# Patient Record
Sex: FEMALE | Race: WHITE | NOT HISPANIC OR LATINO | Employment: OTHER | URBAN - METROPOLITAN AREA
[De-identification: names, ages, dates, MRNs, and addresses within clinical notes are randomized per-mention and may not be internally consistent; named-entity substitution may affect disease eponyms.]

---

## 2017-07-21 ENCOUNTER — OFFICE VISIT (OUTPATIENT)
Dept: OBSTETRICS AND GYNECOLOGY | Facility: CLINIC | Age: 42
End: 2017-07-21

## 2017-07-21 VITALS
SYSTOLIC BLOOD PRESSURE: 118 MMHG | WEIGHT: 293 LBS | DIASTOLIC BLOOD PRESSURE: 94 MMHG | BODY MASS INDEX: 48.82 KG/M2 | HEIGHT: 65 IN

## 2017-07-21 DIAGNOSIS — E78.5 DYSLIPIDEMIA: ICD-10-CM

## 2017-07-21 DIAGNOSIS — Z30.014 ENCOUNTER FOR INITIAL PRESCRIPTION OF INTRAUTERINE CONTRACEPTIVE DEVICE: Primary | ICD-10-CM

## 2017-07-21 DIAGNOSIS — I10 ESSENTIAL HYPERTENSION: ICD-10-CM

## 2017-07-21 DIAGNOSIS — Z13.9 SCREENING: ICD-10-CM

## 2017-07-21 DIAGNOSIS — E66.01 MORBID OBESITY WITH BMI OF 45.0-49.9, ADULT (HCC): ICD-10-CM

## 2017-07-21 LAB
B-HCG UR QL: NEGATIVE
INTERNAL NEGATIVE CONTROL: NEGATIVE
INTERNAL POSITIVE CONTROL: POSITIVE
Lab: NORMAL

## 2017-07-21 PROCEDURE — 99213 OFFICE O/P EST LOW 20 MIN: CPT | Performed by: OBSTETRICS & GYNECOLOGY

## 2017-07-21 PROCEDURE — 81025 URINE PREGNANCY TEST: CPT | Performed by: OBSTETRICS & GYNECOLOGY

## 2017-07-21 RX ORDER — TOPIRAMATE 100 MG/1
TABLET, FILM COATED ORAL DAILY
COMMUNITY
Start: 2017-06-22 | End: 2021-12-29

## 2017-07-21 NOTE — PROGRESS NOTES
[facility], [place], operated by [facility]     2017      Patient:  Maritza Asencio   MR#:[id number]    Office note    CC: contraception     Subjective     History of Present Illness  42 y.o. female  here today for consult on contraception.  The patient was most interested in tubal ligation.  We discussed that her BMI of 50 with a very difficult.  I discussed alternatives to permanent sterilization including IUD placement.  Patient's very interested in IUD placement.  She'll follow up after her next period for renal placement.  We discussed Mirena and that related side effects and pros and cons.      Patient Active Problem List   Diagnosis   • Pulmonary edema, acute   • Morbid obesity with BMI of 45.0-49.9, adult   • Well female exam with routine gynecological exam       Past Medical History:   Diagnosis Date   • Anemia in pregnancy 3/23/2016   • Anxiety    • Depression    • Hyperlipidemia    • Hypertension    • Migraine    • UTI (urinary tract infection) during pregnancy 10/19/2015    E-coli       Past Surgical History:   Procedure Laterality Date   •  SECTION N/A 2016    Procedure:  SECTION PRIMARY;  Surgeon: Xander Rodgers MD;  Location: [facility] LABOR DELIVERY;  Service:    • CHOLECYSTECTOMY         Obstetric History:  OB History      Para Term  AB TAB SAB Ectopic Multiple Living    2 1 1  1  1  0 1        Obstetric Comments    3/22/16 - Normal growth - EFW 52%, AC 36% at 27-6 weeks  16 US 32 2/  EFW 61% AC 61% NORMAL GROWTH BPP 8/8 MERRY NORMAL  MELINA SAGASTUME  16 biophysical profile 8 out of 8 amniotic fluid index 10 cm  MELINA         Menstrual History:     Patient's last menstrual period was 2017.       #: 1, Date: 2009, Sex: None, Weight: None, GA: 5w0d, Delivery: Spontaneous , Apgar1: None, Apgar5: None, Living: Fetal Demise, Birth Comments: None    #: 2, Date: 16, Sex: Female, Weight: 7 lb 14.8 oz (3.595 kg), GA: 39w1d, Delivery: , Low Transverse,  "Apgar1: 9, Apgar5: 9, Living: Yes, Birth Comments: None      Family History   Problem Relation Age of Onset   • Heart disease Father    • Diabetes Father    • Heart attack Father       at age 52   • Hyperlipidemia Father    • Hypertension Mother    • Multiple sclerosis Sister        Social History   Substance Use Topics   • Smoking status: Never Smoker   • Smokeless tobacco: Never Used   • Alcohol use No       Review of patient's allergies indicates no known allergies.      Current Outpatient Prescriptions:   •  atenolol (TENORMIN) 25 MG tablet, Take 25 mg by mouth Daily., Disp: , Rfl:   •  ibuprofen (ADVIL,MOTRIN) 800 MG tablet, , Disp: , Rfl: 5  •  Loratadine (CLARITIN PO), Take 10 mg by mouth daily., Disp: , Rfl:   •  topiramate (TOPAMAX) 100 MG tablet, , Disp: , Rfl:     The following portions of the patient's history were reviewed and updated as appropriate: allergies, current medications, past family history, past medical history, past social history, past surgical history and problem list.    Review of Systems    BP Readings from Last 3 Encounters:   17 118/94   17 138/82   16 120/80      Wt Readings from Last 3 Encounters:   17 299 lb (136 kg)   17 290 lb (132 kg)   16 295 lb (134 kg)      BMI: Estimated body mass index is 49.76 kg/(m^2) as calculated from the following:    Height as of this encounter: 65\" (165.1 cm).    Weight as of this encounter: 299 lb (136 kg).  BSA: Estimated body surface area is 2.35 meters squared as calculated from the following:    Height as of this encounter: 65\" (165.1 cm).    Weight as of this encounter: 299 lb (136 kg).    Objective   Physical Exam   Constitutional: She is oriented to person, place, and time. She appears well-developed and well-nourished.   Cardiovascular: Normal rate and regular rhythm.    Pulmonary/Chest: Effort normal and breath sounds normal.   Abdominal: Soft. Bowel sounds are normal. She exhibits no distension and " no mass. There is no tenderness.   Neurological: She is alert and oriented to person, place, and time.   Psychiatric: She has a normal mood and affect. Her behavior is normal. Judgment and thought content normal.   Nursing note and vitals reviewed.        Assessment/Plan     1.  Interseptal counseling-plan Mirena IUD, patient to follow up after her next menstrual cycle  2.  Morbid obesity  3.  Hypertension - stable  4.  Dyslipidemia - No treatment as this time      Plan:  F/u IUD placement     Xander Rodgers MD   7/21/2017 10:22 AM

## 2017-11-15 ENCOUNTER — OFFICE VISIT (OUTPATIENT)
Dept: OBSTETRICS AND GYNECOLOGY | Facility: CLINIC | Age: 42
End: 2017-11-15

## 2017-11-15 VITALS
WEIGHT: 293 LBS | HEIGHT: 65 IN | SYSTOLIC BLOOD PRESSURE: 116 MMHG | BODY MASS INDEX: 48.82 KG/M2 | DIASTOLIC BLOOD PRESSURE: 80 MMHG

## 2017-11-15 DIAGNOSIS — E78.5 DYSLIPIDEMIA: ICD-10-CM

## 2017-11-15 DIAGNOSIS — E66.01 MORBID OBESITY WITH BMI OF 45.0-49.9, ADULT (HCC): ICD-10-CM

## 2017-11-15 DIAGNOSIS — I10 ESSENTIAL HYPERTENSION: ICD-10-CM

## 2017-11-15 DIAGNOSIS — Z11.3 SCREEN FOR STD (SEXUALLY TRANSMITTED DISEASE): ICD-10-CM

## 2017-11-15 DIAGNOSIS — Z13.89 SCREENING FOR BLOOD OR PROTEIN IN URINE: ICD-10-CM

## 2017-11-15 DIAGNOSIS — Z12.4 SCREENING FOR MALIGNANT NEOPLASM OF CERVIX: ICD-10-CM

## 2017-11-15 DIAGNOSIS — Z01.419 WELL FEMALE EXAM WITH ROUTINE GYNECOLOGICAL EXAM: Primary | ICD-10-CM

## 2017-11-15 DIAGNOSIS — Z12.31 SCREENING MAMMOGRAM, ENCOUNTER FOR: ICD-10-CM

## 2017-11-15 LAB
BILIRUB BLD-MCNC: NEGATIVE MG/DL
CLARITY, POC: CLEAR
COLOR UR: YELLOW
GLUCOSE UR STRIP-MCNC: NEGATIVE MG/DL
KETONES UR QL: NEGATIVE
LEUKOCYTE EST, POC: NEGATIVE
NITRITE UR-MCNC: NEGATIVE MG/ML
PH UR: 5.5 [PH] (ref 5–8)
PROT UR STRIP-MCNC: NEGATIVE MG/DL
RBC # UR STRIP: NEGATIVE /UL
SP GR UR: 1.02 (ref 1–1.03)
UROBILINOGEN UR QL: NORMAL

## 2017-11-15 PROCEDURE — 99396 PREV VISIT EST AGE 40-64: CPT | Performed by: OBSTETRICS & GYNECOLOGY

## 2017-11-15 NOTE — PROGRESS NOTES
[facility]  Routine Annual Visit    11/15/2017    Patient: Maritza Asencio          MR#:[id number]      History of Present Illness    42 y.o. female  who presents for annual exam.        Patient's last menstrual period was 2017 (exact date).  Obstetric History:  OB History      Para Term  AB Living    2 1 1  1 1    SAB TAB Ectopic Multiple Live Births    1   0 1         Menstrual History:     Patient's last menstrual period was 2017 (exact date).       Sexual History:       ________________________________________  Patient Active Problem List   Diagnosis   • Morbid obesity with BMI of 45.0-49.9, adult   • Well female exam with routine gynecological exam   • Essential hypertension   • Dyslipidemia       Past Medical History:   Diagnosis Date   • Anemia in pregnancy 3/23/2016   • Anxiety    • Depression    • Hyperlipidemia    • Hypertension    • Migraine    • Pulmonary edema, acute 2016    Postpartum day#10, EKG nl, Echo normal EF, , Sx improved with lasix.    • UTI (urinary tract infection) during pregnancy 10/19/2015    E-coli       Past Surgical History:   Procedure Laterality Date   •  SECTION N/A 2016    Procedure:  SECTION PRIMARY;  Surgeon: Xander Rodgers MD;  Location: [facility] LABOR DELIVERY;  Service:    • CHOLECYSTECTOMY         History   Smoking Status   • Never Smoker   Smokeless Tobacco   • Never Used       Family History   Problem Relation Age of Onset   • Heart disease Father    • Diabetes Father    • Heart attack Father       at age 52   • Hyperlipidemia Father    • Hypertension Mother    • Multiple sclerosis Sister        Prior to Admission medications    Medication Sig Start Date End Date Taking? Authorizing Provider   atenolol (TENORMIN) 25 MG tablet Take 25 mg by mouth Daily.   Yes Nurse [facility] Emergency, RN   ibuprofen (ADVIL,MOTRIN) 800 MG tablet Every 6 (Six) Hours As Needed. 16  Yes Historical Provider, MD  "  Loratadine (CLARITIN PO) Take 10 mg by mouth daily.   Yes Historical Provider, MD   topiramate (TOPAMAX) 100 MG tablet Daily. 6/22/17  Yes Historical Provider, MD     ________________________________________    Current contraception: condoms  History of abnormal Pap smear: no  Family history of uterine or ovarian cancer: no  Family History of colon cancer/colon polyps: no  History of abnormal mammogram: no  History of abnormal lipids: yes - Not treated currently     The following portions of the patient's history were reviewed and updated as appropriate: allergies, current medications, past family history, past medical history, past social history, past surgical history and problem list.    Review of Systems    Pertinent items are noted in HPI.     Objective   Physical Exam    /80  Ht 65\" (165.1 cm)  Wt 294 lb (133 kg)  LMP 11/09/2017 (Exact Date)  Breastfeeding? No  BMI 48.92 kg/m2   BP Readings from Last 3 Encounters:   11/15/17 116/80   07/21/17 118/94   05/16/17 138/82      Wt Readings from Last 3 Encounters:   11/15/17 294 lb (133 kg)   07/21/17 299 lb (136 kg)   05/16/17 290 lb (132 kg)        BMI: Estimated body mass index is 48.92 kg/(m^2) as calculated from the following:    Height as of this encounter: 65\" (165.1 cm).    Weight as of this encounter: 294 lb (133 kg).    General:   alert, appears stated age and cooperative   Heart: regular rate and rhythm, S1, S2 normal, no murmur, click, rub or gallop   Lungs: clear to auscultation bilaterally   Abdomen: soft, non-tender, without masses or organomegaly and morbid obesity   Breast: inspection negative, no nipple discharge or bleeding, no masses or nodularity palpable   Vulva: normal   Vagina: normal mucosa   Cervix: no lesions   Uterus: normal size or Size limits the exam   Adnexa: exam limited by habitus     As part of wellness and prevention, the following topics were discussed with the patient:  []  Nutrition  []  Physical activity/regular " exercise   []  Healthy weight  []  Injury prevention  []  Substance misuse/abuse  []  Sexual behavior  []  STD prevention  [x]  Contaception  []  Dental health  []  Mental health  []  Immunization  [x]  Encouraged SBE       Assessment:    Maritza was seen today for gynecologic exam.    Diagnoses and all orders for this visit:    Screening for blood or protein in urine  -     POC Urinalysis Dipstick    Screening for malignant neoplasm of cervix  -     IgP, Aptima HPV - ThinPrep Vial, Cervix    Dyslipidemia  - management by Dr. Zurita    Essential hypertension  - management by Dr. Zurita    Morbid obesity with BMI of 45.0-49.9, adult    Well female exam with routine gynecological exam    Screening mammogram, encounter for  -     Mammo Screening Bilateral With CAD; Future      Plan:  Return in about 1 year (around 11/15/2018) for Annual GYN exam.      Xander Rodgers MD  11/15/2017 9:43 AM

## 2017-11-17 LAB
CYTOLOGIST CVX/VAG CYTO: NORMAL
CYTOLOGY CVX/VAG DOC THIN PREP: NORMAL
DX ICD CODE: NORMAL
DX ICD CODE: NORMAL
HIV 1 & 2 AB SER-IMP: NORMAL
HPV I/H RISK 4 DNA CVX QL PROBE+SIG AMP: NEGATIVE
OTHER STN SPEC: NORMAL
PATH REPORT.FINAL DX SPEC: NORMAL
STAT OF ADQ CVX/VAG CYTO-IMP: NORMAL

## 2017-11-20 ENCOUNTER — TELEPHONE (OUTPATIENT)
Dept: OBSTETRICS AND GYNECOLOGY | Facility: CLINIC | Age: 42
End: 2017-11-20

## 2017-11-20 NOTE — TELEPHONE ENCOUNTER
----- Message from Xander Rodgers MD sent at 11/17/2017  9:16 PM EST -----  Call pt:  PAP is negative.  + Yeast on PAP - treatment indicated if symptomatic

## 2017-11-21 LAB
C TRACH RRNA SPEC QL NAA+PROBE: NEGATIVE
N GONORRHOEA RRNA SPEC QL NAA+PROBE: NEGATIVE
T VAGINALIS RRNA SPEC QL NAA+PROBE: NEGATIVE

## 2017-11-30 ENCOUNTER — HOSPITAL ENCOUNTER (OUTPATIENT)
Dept: MAMMOGRAPHY | Facility: HOSPITAL | Age: 42
Discharge: HOME | End: 2017-11-30
Attending: OBSTETRICS & GYNECOLOGY | Admitting: OBSTETRICS & GYNECOLOGY

## 2017-11-30 DIAGNOSIS — Z12.31 SCREENING MAMMOGRAM, ENCOUNTER FOR: ICD-10-CM

## 2017-11-30 PROCEDURE — G0202 SCR MAMMO BI INCL CAD: HCPCS

## 2017-12-04 ENCOUNTER — TELEPHONE (OUTPATIENT)
Dept: OBSTETRICS AND GYNECOLOGY | Facility: CLINIC | Age: 42
End: 2017-12-04

## 2017-12-04 NOTE — TELEPHONE ENCOUNTER
----- Message from Xander Rodgers MD sent at 12/2/2017  3:10 AM EST -----  Call patient: Normal mammogram

## 2017-12-05 ENCOUNTER — TELEPHONE (OUTPATIENT)
Dept: OBSTETRICS AND GYNECOLOGY | Facility: CLINIC | Age: 42
End: 2017-12-05

## 2018-11-28 ENCOUNTER — OFFICE VISIT (OUTPATIENT)
Dept: OBSTETRICS AND GYNECOLOGY | Age: 43
End: 2018-11-28

## 2018-11-28 VITALS
SYSTOLIC BLOOD PRESSURE: 118 MMHG | BODY MASS INDEX: 48.82 KG/M2 | DIASTOLIC BLOOD PRESSURE: 80 MMHG | WEIGHT: 293 LBS | HEIGHT: 65 IN

## 2018-11-28 DIAGNOSIS — E78.5 DYSLIPIDEMIA: ICD-10-CM

## 2018-11-28 DIAGNOSIS — E66.01 MORBID OBESITY WITH BMI OF 45.0-49.9, ADULT (HCC): ICD-10-CM

## 2018-11-28 DIAGNOSIS — Z12.31 SCREENING MAMMOGRAM, ENCOUNTER FOR: ICD-10-CM

## 2018-11-28 DIAGNOSIS — I10 ESSENTIAL HYPERTENSION: ICD-10-CM

## 2018-11-28 DIAGNOSIS — Z01.419 WELL FEMALE EXAM WITH ROUTINE GYNECOLOGICAL EXAM: Primary | ICD-10-CM

## 2018-11-28 DIAGNOSIS — Z12.4 PAP SMEAR FOR CERVICAL CANCER SCREENING: ICD-10-CM

## 2018-11-28 DIAGNOSIS — Z13.89 SCREENING FOR BLOOD OR PROTEIN IN URINE: ICD-10-CM

## 2018-11-28 PROCEDURE — 99396 PREV VISIT EST AGE 40-64: CPT | Performed by: OBSTETRICS & GYNECOLOGY

## 2018-11-28 NOTE — PROGRESS NOTES
Routine Annual Visit    2018    Patient: Maritza Asencio          MR#:[id number]      History of Present Illness    Chief Complaint   Patient presents with   • Gynecologic Exam     Annual.  Last pap 11/15/17, negative. Last mammo 17, negative.        43 y.o. female  who presents for annual exam.    The patient presents for routine annual exam feeling well without complaints.        Patient's last menstrual period was 2018 (approximate).  Obstetric History:  OB History      Para Term  AB Living    2 1 1   1 1    SAB TAB Ectopic Molar Multiple Live Births    1       0 1         Menstrual History:     Patient's last menstrual period was 2018 (approximate).       Sexual History:       ________________________________________  Patient Active Problem List   Diagnosis   • Morbid obesity with BMI of 45.0-49.9, adult (CMS/[facility])   • Well female exam with routine gynecological exam   • Essential hypertension   • Dyslipidemia       Past Medical History:   Diagnosis Date   • Anemia in pregnancy 3/23/2016   • Anxiety    • Depression    • Hyperlipidemia    • Hypertension    • Migraine    • Pulmonary edema, acute (CMS/[facility]) 2016    Postpartum day#10, EKG nl, Echo normal EF, , Sx improved with lasix.    • UTI (urinary tract infection) during pregnancy 10/19/2015    E-coli       Past Surgical History:   Procedure Laterality Date   • ANAL FISSURECTOMY  2018   • CHOLECYSTECTOMY     • HEMORRHOIDECTOMY  2018       Social History     Tobacco Use   Smoking Status Never Smoker   Smokeless Tobacco Never Used       Family History   Problem Relation Age of Onset   • Heart disease Father    • Diabetes Father    • Heart attack Father          at age 52   • Hyperlipidemia Father    • Hypertension Mother    • Multiple sclerosis Sister        Prior to Admission medications    Medication Sig Start Date End Date Taking? Authorizing Provider   atenolol (TENORMIN) 25 MG tablet  "Take 25 mg by mouth Daily.   Yes Emergency, Nurse Blanka, RN   Loratadine (CLARITIN PO) Take 10 mg by mouth daily.   Yes Provider, Kerri, MD   topiramate (TOPAMAX) 100 MG tablet Daily. 6/22/17  Yes Provider, Kerri, MD     ________________________________________    Current contraception: condoms  History of abnormal Pap smear: no  Family history of uterine or ovarian cancer: no  Family History of colon cancer/colon polyps: no  History of abnormal mammogram: no  History of abnormal lipids: yes - managed per primary MD     The following portions of the patient's history were reviewed and updated as appropriate: allergies, current medications, past family history, past medical history, past social history, past surgical history and problem list.    Review of Systems    Pertinent items are noted in HPI.     Objective   Physical Exam    /80   Ht 165.1 cm (65\")   Wt 135 kg (297 lb)   LMP 11/02/2018 (Approximate)   Breastfeeding? No   BMI 49.42 kg/m²    BP Readings from Last 3 Encounters:   11/28/18 118/80   11/15/17 116/80   07/21/17 118/94      Wt Readings from Last 3 Encounters:   11/28/18 135 kg (297 lb)   11/15/17 133 kg (294 lb)   07/21/17 136 kg (299 lb)        BMI: Estimated body mass index is 49.42 kg/m² as calculated from the following:    Height as of this encounter: 165.1 cm (65\").    Weight as of this encounter: 135 kg (297 lb).       General: alert, appears stated age, cooperative and morbidly obese   Heart: regular rate and rhythm, S1, S2 normal, no murmur, click, rub or gallop   Lungs: clear to auscultation bilaterally   Abdomen: soft, non-tender, without masses or organomegaly   Breast: inspection negative, no nipple discharge or bleeding, no masses or nodularity palpable   Vulva: normal and bartholin's, Urethra, [place]'s normal   Vagina: normal mucosa, normal discharge   Cervix: multiparous appearance   Uterus: normal size or exam is limited habitus    Adnexa: exam limited by habitus "     As part of wellness and prevention, the following topics were discussed with the patient:    []  Nutrition  [x]  Physical activity/regular exercise   []  Healthy weight  []  Injury prevention  []  Smoking cessation  []  Substance misuse/abuse  []  Sexual behavior  []  STD prevention  []  Contaception  []  Dental health  []  Mental health  []  Immunization  [x]  Encouraged SBE        Assessment:    Maritza was seen today for gynecologic exam.    Diagnoses and all orders for this visit:    Well female exam with routine gynecological exam  -     IgP, Aptima HPV    Screening for blood or protein in urine  -     POC Urinalysis Dipstick    Pap smear for cervical cancer screening  -     IgP, Aptima HPV    Morbid obesity with BMI of 45.0-49.9, adult (CMS/[facility])    Essential hypertension (Chronic - stable)    Dyslipidemia (Chronic - stable)  - management per primary MD     Screening mammogram, encounter for  -     Mammo Screening Bilateral With CAD; Future      Plan:  Return in about 1 year (around 11/28/2019) for Annual GYN exam.      Xander Rodgers MD  11/28/2018 9:27 AM

## 2018-11-30 LAB
CYTOLOGIST CVX/VAG CYTO: ABNORMAL
CYTOLOGY CVX/VAG DOC THIN PREP: ABNORMAL
DX ICD CODE: ABNORMAL
DX ICD CODE: ABNORMAL
HIV 1 & 2 AB SER-IMP: ABNORMAL
HPV I/H RISK 4 DNA CVX QL PROBE+SIG AMP: NEGATIVE
OTHER STN SPEC: ABNORMAL
PATH REPORT.FINAL DX SPEC: ABNORMAL
PATHOLOGIST CVX/VAG CYTO: ABNORMAL
RECOM F/U CVX/VAG CYTO: ABNORMAL
STAT OF ADQ CVX/VAG CYTO-IMP: ABNORMAL

## 2018-12-03 ENCOUNTER — TELEPHONE (OUTPATIENT)
Dept: OBSTETRICS AND GYNECOLOGY | Age: 43
End: 2018-12-03

## 2018-12-03 NOTE — TELEPHONE ENCOUNTER
----- Message from Xander Rodgers MD sent at 12/1/2018  7:44 AM EST -----  Call patient: Pap shows atypical cells of undetermined significance with hybrid capture negative    No treatment is needed at this time.    Patient is advised to follow up annually for PAP

## 2018-12-11 ENCOUNTER — HOSPITAL ENCOUNTER (OUTPATIENT)
Dept: MAMMOGRAPHY | Facility: HOSPITAL | Age: 43
Discharge: HOME | End: 2018-12-11
Attending: OBSTETRICS & GYNECOLOGY | Admitting: OBSTETRICS & GYNECOLOGY

## 2018-12-11 DIAGNOSIS — Z12.31 SCREENING MAMMOGRAM, ENCOUNTER FOR: ICD-10-CM

## 2018-12-11 PROCEDURE — 77067 SCR MAMMO BI INCL CAD: CPT

## 2018-12-13 ENCOUNTER — TELEPHONE (OUTPATIENT)
Dept: OBSTETRICS AND GYNECOLOGY | Age: 43
End: 2018-12-13

## 2018-12-13 NOTE — TELEPHONE ENCOUNTER
----- Message from Xander Rodgers MD sent at 12/13/2018 11:38 AM EST -----  Notify patient:  Mammogram is benign

## 2019-12-18 ENCOUNTER — OFFICE VISIT (OUTPATIENT)
Dept: OBSTETRICS AND GYNECOLOGY | Age: 44
End: 2019-12-18

## 2019-12-18 VITALS
BODY MASS INDEX: 50.02 KG/M2 | HEIGHT: 64 IN | DIASTOLIC BLOOD PRESSURE: 78 MMHG | WEIGHT: 293 LBS | SYSTOLIC BLOOD PRESSURE: 116 MMHG

## 2019-12-18 DIAGNOSIS — I10 ESSENTIAL HYPERTENSION: ICD-10-CM

## 2019-12-18 DIAGNOSIS — Z12.4 PAP SMEAR FOR CERVICAL CANCER SCREENING: ICD-10-CM

## 2019-12-18 DIAGNOSIS — Z01.419 WELL FEMALE EXAM WITH ROUTINE GYNECOLOGICAL EXAM: Primary | ICD-10-CM

## 2019-12-18 DIAGNOSIS — Z13.89 SCREENING FOR BLOOD OR PROTEIN IN URINE: ICD-10-CM

## 2019-12-18 DIAGNOSIS — Z12.31 SCREENING MAMMOGRAM, ENCOUNTER FOR: ICD-10-CM

## 2019-12-18 DIAGNOSIS — E66.01 MORBID OBESITY WITH BMI OF 50.0-59.9, ADULT (HCC): ICD-10-CM

## 2019-12-18 DIAGNOSIS — E78.5 DYSLIPIDEMIA: ICD-10-CM

## 2019-12-18 PROBLEM — E78.2 MIXED HYPERLIPIDEMIA: Status: ACTIVE | Noted: 2019-03-05

## 2019-12-18 PROBLEM — F41.9 ANXIETY: Status: ACTIVE | Noted: 2019-03-05

## 2019-12-18 LAB
BILIRUB BLD-MCNC: NEGATIVE MG/DL
CLARITY, POC: CLEAR
COLOR UR: YELLOW
GLUCOSE UR STRIP-MCNC: NEGATIVE MG/DL
KETONES UR QL: NEGATIVE
LEUKOCYTE EST, POC: NEGATIVE
NITRITE UR-MCNC: NEGATIVE MG/ML
PH UR: 6 [PH] (ref 5–8)
PROT UR STRIP-MCNC: NEGATIVE MG/DL
RBC # UR STRIP: NEGATIVE /UL
SP GR UR: 1.02 (ref 1–1.03)
UROBILINOGEN UR QL: NORMAL

## 2019-12-18 PROCEDURE — 99396 PREV VISIT EST AGE 40-64: CPT | Performed by: OBSTETRICS & GYNECOLOGY

## 2019-12-18 PROCEDURE — 81002 URINALYSIS NONAUTO W/O SCOPE: CPT | Performed by: OBSTETRICS & GYNECOLOGY

## 2019-12-18 RX ORDER — BACLOFEN 10 MG/1
10 TABLET ORAL 2 TIMES DAILY
COMMUNITY
Start: 2019-12-04 | End: 2020-10-14

## 2019-12-18 RX ORDER — BUPROPION HYDROCHLORIDE 150 MG/1
1 TABLET ORAL DAILY
COMMUNITY
Start: 2019-02-08 | End: 2020-10-14

## 2019-12-18 RX ORDER — ATORVASTATIN CALCIUM 10 MG/1
1 TABLET, FILM COATED ORAL DAILY
COMMUNITY
Start: 2019-02-11 | End: 2020-10-14

## 2019-12-18 NOTE — PROGRESS NOTES
Routine Annual Visit    2019    Patient: Maritza Asencio          MR#:[id number]      History of Present Illness    Chief Complaint   Patient presents with   • Gynecologic Exam     Annual.  last pap 18, ASCUS/hpv negative. Last mammo 18, negative.        44 y.o. female  who presents for annual exam.    The patient presents for routine annual exam without major complaints.  She is currently using condoms for contraception and is inquiring about permanent sterilization.  The patient's current medical condition is complicated by morbid obesity with a body mass index greater than 52.  It is discussed with her that she is an exceptionally poor candidate for even laparoscopic surgery.      Patient's last menstrual period was 2019 (approximate).  Obstetric History:  OB History        2    Para   1    Term   1            AB   1    Living   1       SAB   1    TAB        Ectopic        Molar        Multiple   0    Live Births   1               Menstrual History:     Patient's last menstrual period was 2019 (approximate).       Sexual History:       ________________________________________  Patient Active Problem List   Diagnosis   • Well female exam with routine gynecological exam   • Essential hypertension   • Dyslipidemia   • Mixed hyperlipidemia   • Anxiety   • Morbid obesity with BMI of 50.0-59.9, adult (CMS/[facility])       Past Medical History:   Diagnosis Date   • Anemia in pregnancy 3/23/2016   • Anxiety    • Depression    • Hyperlipidemia    • Hypertension    • Migraine    • Pulmonary edema, acute (CMS/[facility]) 2016    Postpartum day#10, EKG nl, Echo normal EF, , Sx improved with lasix.    • UTI (urinary tract infection) during pregnancy 10/19/2015    E-coli       Past Surgical History:   Procedure Laterality Date   • ANAL FISSURECTOMY  2018   •  SECTION N/A 2016    Procedure:  SECTION PRIMARY;  Surgeon: Xander Rodgers MD;  Location:   "[facility] LABOR DELIVERY;  Service:    • CHOLECYSTECTOMY     • HEMORRHOIDECTOMY  2018       Social History     Tobacco Use   Smoking Status Never Smoker   Smokeless Tobacco Never Used       Family History   Problem Relation Age of Onset   • Heart disease Father    • Diabetes Father    • Heart attack Father          at age 52   • Hyperlipidemia Father    • Hypertension Mother    • Multiple sclerosis Sister    • Breast cancer Neg Hx        Prior to Admission medications    Medication Sig Start Date End Date Taking? Authorizing Provider   atenolol (TENORMIN) 25 MG tablet Take 25 mg by mouth Daily.   Yes Emergency, Nurse Blanka, RN   atorvastatin (LIPITOR) 10 MG tablet Take 1 tablet by mouth Daily. 19  Yes ProviderKerri MD   baclofen (LIORESAL) 10 MG tablet Take 10 mg by mouth 2 (Two) Times a Day. 19  Yes Kerri Ruiz MD   buPROPion XL (WELLBUTRIN XL) 150 MG 24 hr tablet Take 1 tablet by mouth Daily. 19  Yes Kerri Ruiz MD   Loratadine (CLARITIN PO) Take 10 mg by mouth daily.   Yes Kerri Ruiz MD   topiramate (TOPAMAX) 100 MG tablet Daily. 17  Yes Kerri Ruiz MD     ________________________________________    Current contraception: condoms  History of abnormal Pap smear: no  Family history of uterine or ovarian cancer: no  Family History of colon cancer/colon polyps: no  History of abnormal mammogram: no  History of abnormal lipids: yes - treated    The following portions of the patient's history were reviewed and updated as appropriate: allergies, current medications, past family history, past medical history, past social history, past surgical history and problem list.    Review of Systems    Pertinent items are noted in HPI.     Objective   Physical Exam    /78   Ht 162.6 cm (64\")   Wt (!) 139 kg (306 lb)   LMP 2019 (Approximate)   Breastfeeding No   BMI 52.52 kg/m²    BP Readings from Last 3 Encounters:   19 116/78 " "  11/28/18 118/80   11/15/17 116/80      Wt Readings from Last 3 Encounters:   12/18/19 (!) 139 kg (306 lb)   11/28/18 135 kg (297 lb)   11/15/17 133 kg (294 lb)        BMI: Estimated body mass index is 52.52 kg/m² as calculated from the following:    Height as of this encounter: 162.6 cm (64\").    Weight as of this encounter: 139 kg (306 lb).       General: alert, appears stated age, cooperative and moderately obese   Heart: regular rate and rhythm, S1, S2 normal, no murmur, click, rub or gallop   Lungs: clear to auscultation bilaterally   Abdomen: soft, non-tender, without masses, no organomegaly   Breast: inspection negative, no nipple discharge or bleeding, no masses or nodularity palpable   Vulva: normal and bartholin's, Urethra, [place]'s normal   Vagina: normal mucosa, normal discharge   Cervix: no lesions, nulliparous appearance and difficult to visualize    Uterus: normal size or exam is limited habitus    Adnexa: exam limited by habitus     As part of wellness and prevention, the following topics were discussed with the patient:     []  Nutrition  []  Physical activity/regular exercise   []  Healthy weight  []  Injury prevention  []  Smoking cessation  []  Substance misuse/abuse  []  Sexual behavior  []  STD prevention  []  Contaception  []  Dental health  []  Mental health  []  Immunization  [x]  Encouraged SBE        Assessment:    Maritza was seen today for gynecologic exam.    Diagnoses and all orders for this visit:    Well female exam with routine gynecological exam  -     IgP, Aptima HPV    Screening for blood or protein in urine  -     POC Urinalysis Dipstick    Pap smear for cervical cancer screening  -     IgP, Aptima HPV    Essential hypertension    Dyslipidemia    Morbid obesity with BMI of 50.0-59.9, adult (CMS/[facility])    Screening mammogram, encounter for  -     Mammo Screening Bilateral With CAD; Future          Plan:  Return in about 1 year (around 12/18/2020) for Annual GYN exam.      Xander CRAIG" MD Vishal  12/18/2019 1:48 PM

## 2019-12-20 LAB
CYTOLOGIST CVX/VAG CYTO: NORMAL
CYTOLOGY CVX/VAG DOC CYTO: NORMAL
CYTOLOGY CVX/VAG DOC THIN PREP: NORMAL
DX ICD CODE: NORMAL
HIV 1 & 2 AB SER-IMP: NORMAL
HPV I/H RISK 4 DNA CVX QL PROBE+SIG AMP: NEGATIVE
OTHER STN SPEC: NORMAL
STAT OF ADQ CVX/VAG CYTO-IMP: NORMAL

## 2019-12-24 ENCOUNTER — TELEPHONE (OUTPATIENT)
Dept: OBSTETRICS AND GYNECOLOGY | Age: 44
End: 2019-12-24

## 2019-12-24 NOTE — TELEPHONE ENCOUNTER
----- Message from Xander Rodgers MD sent at 12/20/2019  8:45 PM EST -----  Call pt:  PAP is negative.

## 2020-09-25 ENCOUNTER — TELEPHONE (OUTPATIENT)
Dept: OBSTETRICS AND GYNECOLOGY | Age: 45
End: 2020-09-25

## 2020-10-14 ENCOUNTER — PROCEDURE VISIT (OUTPATIENT)
Dept: OBSTETRICS AND GYNECOLOGY | Age: 45
End: 2020-10-14

## 2020-10-14 ENCOUNTER — OFFICE VISIT (OUTPATIENT)
Dept: OBSTETRICS AND GYNECOLOGY | Age: 45
End: 2020-10-14

## 2020-10-14 VITALS
BODY MASS INDEX: 50.02 KG/M2 | WEIGHT: 293 LBS | HEIGHT: 64 IN | SYSTOLIC BLOOD PRESSURE: 128 MMHG | DIASTOLIC BLOOD PRESSURE: 74 MMHG

## 2020-10-14 DIAGNOSIS — O36.80X0 PREGNANCY WITH UNCERTAIN FETAL VIABILITY, SINGLE OR UNSPECIFIED FETUS: Primary | ICD-10-CM

## 2020-10-14 DIAGNOSIS — Z32.00 POSSIBLE PREGNANCY, NOT CONFIRMED: ICD-10-CM

## 2020-10-14 DIAGNOSIS — O09.521 MULTIGRAVIDA OF ADVANCED MATERNAL AGE IN FIRST TRIMESTER: ICD-10-CM

## 2020-10-14 DIAGNOSIS — Z3A.09 9 WEEKS GESTATION OF PREGNANCY: ICD-10-CM

## 2020-10-14 DIAGNOSIS — O36.80X0 ENCOUNTER TO DETERMINE FETAL VIABILITY OF PREGNANCY, SINGLE OR UNSPECIFIED FETUS: ICD-10-CM

## 2020-10-14 DIAGNOSIS — O21.9 NAUSEA AND VOMITING DURING PREGNANCY: ICD-10-CM

## 2020-10-14 DIAGNOSIS — Z36.9 ANTENATAL SCREENING ENCOUNTER: ICD-10-CM

## 2020-10-14 DIAGNOSIS — E66.01 MORBID OBESITY WITH BMI OF 50.0-59.9, ADULT (HCC): Primary | ICD-10-CM

## 2020-10-14 DIAGNOSIS — I10 ESSENTIAL HYPERTENSION: ICD-10-CM

## 2020-10-14 DIAGNOSIS — E66.01 MORBID OBESITY WITH BMI OF 50.0-59.9, ADULT (HCC): ICD-10-CM

## 2020-10-14 PROBLEM — E78.2 MIXED HYPERLIPIDEMIA: Status: RESOLVED | Noted: 2019-03-05 | Resolved: 2020-10-14

## 2020-10-14 PROBLEM — N12 PYELONEPHRITIS: Status: ACTIVE | Noted: 2020-07-09

## 2020-10-14 PROBLEM — E87.6 HYPOKALEMIA: Status: RESOLVED | Noted: 2020-07-08 | Resolved: 2020-10-14

## 2020-10-14 PROBLEM — N20.1 RIGHT URETERAL STONE: Status: ACTIVE | Noted: 2020-07-08

## 2020-10-14 PROBLEM — Q62.5 RENAL DUPLICATE COLLECTING SYSTEM, RIGHT: Status: ACTIVE | Noted: 2020-07-08

## 2020-10-14 PROBLEM — N12 PYELONEPHRITIS: Status: RESOLVED | Noted: 2020-07-09 | Resolved: 2020-10-14

## 2020-10-14 PROBLEM — E87.6 HYPOKALEMIA: Status: ACTIVE | Noted: 2020-07-08

## 2020-10-14 PROCEDURE — 76817 TRANSVAGINAL US OBSTETRIC: CPT | Performed by: OBSTETRICS & GYNECOLOGY

## 2020-10-14 PROCEDURE — 99213 OFFICE O/P EST LOW 20 MIN: CPT | Performed by: OBSTETRICS & GYNECOLOGY

## 2020-10-14 RX ORDER — METHYLDOPA 250 MG/1
TABLET, FILM COATED ORAL
COMMUNITY
Start: 2020-10-05 | End: 2020-12-23

## 2020-10-14 RX ORDER — PRENATAL WITH FERROUS FUM AND FOLIC ACID 3080; 920; 120; 400; 22; 1.84; 3; 20; 10; 1; 12; 200; 27; 25; 2 [IU]/1; [IU]/1; MG/1; [IU]/1; MG/1; MG/1; MG/1; MG/1; MG/1; MG/1; UG/1; MG/1; MG/1; MG/1; MG/1
TABLET ORAL
COMMUNITY
Start: 2020-09-15 | End: 2020-12-23

## 2020-10-14 NOTE — PROGRESS NOTES
10/14/2020      Patient:  Maritza Asencio   MR#:[id number]    Office note    Chief Complaint   Patient presents with   • Possible Pregnancy     Pregnancy confirmation. Flu vac last week at [facility]. ~ 9w 1d.  Cl today?  Prenatal labs today.  Taking a new BP medication.   Cc: fatigue and nausea.  She has four year old daughter.        Subjective     History of Present Illness  45 y.o. female  presents for confirmation of viability attempting pregnancy at 9 weeks by last menstrual period with ultrasound showing discrepant enlarged yolk sac without visible fetal pole.  The study significantly limited by the patient's habitus.  Past medical history is complicated by hypertension.  The patient presents with complaint of intermittent moderate nausea and intermittent moderate fatigue for the past 3 weeks.    Patient Active Problem List   Diagnosis   • Essential hypertension   • Dyslipidemia   • Anxiety   • Renal duplicate collecting system, right   • Right ureteral stone   • Morbid obesity with BMI of 50.0-59.9, adult (CMS/[facility])   • Pregnancy with uncertain fetal viability       Past Medical History:   Diagnosis Date   • Anemia in pregnancy 3/23/2016   • Anxiety    • Depression    • Hyperlipidemia    • Hypertension    • Migraine    • Pulmonary edema, acute (CMS/[facility]) 2016    Postpartum day#10, EKG nl, Echo normal EF, , Sx improved with lasix.    • UTI (urinary tract infection) during pregnancy 10/19/2015    E-coli     Past Surgical History:   Procedure Laterality Date   • ANAL FISSURECTOMY  2018   •  SECTION N/A 2016    Procedure:  SECTION PRIMARY;  Surgeon: Xander Rodgers MD;  Location: [facility] DELIVERY;  Service:    • CHOLECYSTECTOMY     • HEMORRHOIDECTOMY  2018     Obstetric History:  OB History        3    Para   1    Term   1            AB   1    Living   1       SAB   1    TAB        Ectopic        Molar        Multiple   0     Live Births   1               Menstrual History:     Patient's last menstrual period was 08/10/2020.       # 1 - Date: 2009, Sex: None, Weight: None, GA: 5w0d, Delivery: Spontaneous , Apgar1: None, Apgar5: None, Living: Fetal Demise, Birth Comments: None    # 2 - Date: 16, Sex: Female, Weight: 3595 g (7 lb 14.8 oz), GA: 39w1d, Delivery: , Low Transverse, Apgar1: 9, Apgar5: 9, Living: Living, Birth Comments: None    # 3 - Date: None, Sex: None, Weight: None, GA: None, Delivery: None, Apgar1: None, Apgar5: None, Living: None, Birth Comments: None    Family History   Problem Relation Age of Onset   • Heart disease Father    • Diabetes Father    • Heart attack Father          at age 52   • Hyperlipidemia Father    • Hypertension Mother    • Multiple sclerosis Sister    • No Known Problems Daughter    • Breast cancer Neg Hx      Social History     Tobacco Use   • Smoking status: Never Smoker   • Smokeless tobacco: Never Used   Substance Use Topics   • Alcohol use: No   • Drug use: No     Patient has no known allergies.    Current Outpatient Medications:   •  Loratadine (CLARITIN PO), Take 10 mg by mouth daily., Disp: , Rfl:   •  methyldopa (ALDOMET) 250 MG tablet, , Disp: , Rfl:   •  Prenatal Vit-Fe Fumarate-FA (Prenatal 27-) 27-1 MG tablet tablet, , Disp: , Rfl:   •  ondansetron ODT (Zofran ODT) 4 MG disintegrating tablet, Place 1 tablet on the tongue Every 6 (Six) Hours As Needed for Nausea or Vomiting., Disp: 15 tablet, Rfl: 0  •  topiramate (TOPAMAX) 100 MG tablet, Daily., Disp: , Rfl:     The following portions of the patient's history were reviewed and updated as appropriate: allergies, current medications, past family history, past medical history, past social history, past surgical history and problem list.    Review of Systems   Constitutional: Positive for fatigue.   Respiratory: Negative.    Cardiovascular: Negative.    Gastrointestinal: Positive for nausea and vomiting.  "  Genitourinary: Negative.    Psychiatric/Behavioral: Negative.        BP Readings from Last 3 Encounters:   10/14/20 128/74   20 136/84   19 116/78      Wt Readings from Last 3 Encounters:   10/14/20 (!) 143 kg (315 lb)   20 (!) 138 kg (305 lb)   19 (!) 139 kg (306 lb)      BMI: Estimated body mass index is 54.07 kg/m² as calculated from the following:    Height as of this encounter: 162.6 cm (64\").    Weight as of this encounter: 143 kg (315 lb). BSA: Estimated body surface area is 2.37 meters squared as calculated from the following:    Height as of this encounter: 162.6 cm (64\").    Weight as of this encounter: 143 kg (315 lb).    Objective   Physical Exam  Vitals signs and nursing note reviewed.   Constitutional:       Appearance: She is well-developed.   HENT:      Head: Normocephalic and atraumatic.   Cardiovascular:      Rate and Rhythm: Normal rate.   Pulmonary:      Effort: Pulmonary effort is normal.   Abdominal:      General: Bowel sounds are normal. There is no distension.      Palpations: Abdomen is soft.      Tenderness: There is no abdominal tenderness.   Skin:     General: Skin is warm and dry.   Neurological:      Mental Status: She is alert and oriented to person, place, and time.   Psychiatric:         Behavior: Behavior normal.         Thought Content: Thought content normal.         Judgment: Judgment normal.         Assessment/Plan     Diagnoses and all orders for this visit:    1. Pregnancy with uncertain fetal viability, single or unspecified fetus (Primary)    2. Possible pregnancy, not confirmed    3. 9 weeks gestation of pregnancy    4. Multigravida of advanced maternal age in first trimester    5.  screening encounter    6. Essential hypertension    7. Morbid obesity with BMI of 50.0-59.9, adult (CMS/[facility])    8. Nausea and vomiting during pregnancy    Other orders  -     Cancel: OB Panel With HIV  -     Cancel: Varicella Zoster Antibody, IgG  -     Cancel: " Hemoglobin A1c  -     Cancel: TSH  -     Cancel: FluLaval Quad >6 Months (1997-1190)          Return in about 1 week (around 10/21/2020) for Recheck with pelvic Ultrasound to confirm viability .    Xander Rodgers MD   10/14/2020 09:21 EDT

## 2020-10-17 ENCOUNTER — TELEPHONE (OUTPATIENT)
Dept: OBSTETRICS AND GYNECOLOGY | Age: 45
End: 2020-10-17

## 2020-10-17 NOTE — TELEPHONE ENCOUNTER
Patient called after hours, started having some light brown spotting just when she wiped and also mild cramping. Has possibly nonviable pregnancy, 7 wk GS with no fetal pole or yolk sac on 10/14.  Discussed that could be beginnings of spontaneous Ab or could resolve. May pass pregnancy at home but if having heavy bleeding soaking more than pad/hr for two hours or flooding blood to come to ED. She will call back if needs additional assistance    Bev Phelps MD  10/17/2020  18:45 EDT

## 2020-10-21 ENCOUNTER — PROCEDURE VISIT (OUTPATIENT)
Dept: OBSTETRICS AND GYNECOLOGY | Age: 45
End: 2020-10-21

## 2020-10-21 ENCOUNTER — OFFICE VISIT (OUTPATIENT)
Dept: OBSTETRICS AND GYNECOLOGY | Age: 45
End: 2020-10-21

## 2020-10-21 VITALS
DIASTOLIC BLOOD PRESSURE: 76 MMHG | BODY MASS INDEX: 50.02 KG/M2 | SYSTOLIC BLOOD PRESSURE: 140 MMHG | HEIGHT: 64 IN | WEIGHT: 293 LBS

## 2020-10-21 DIAGNOSIS — E66.01 MORBID OBESITY WITH BMI OF 50.0-59.9, ADULT (HCC): ICD-10-CM

## 2020-10-21 DIAGNOSIS — O36.80X0 ENCOUNTER TO DETERMINE FETAL VIABILITY OF PREGNANCY, SINGLE OR UNSPECIFIED FETUS: Primary | ICD-10-CM

## 2020-10-21 DIAGNOSIS — O03.9 COMPLETE ABORTION: Primary | ICD-10-CM

## 2020-10-21 DIAGNOSIS — Z30.011 ENCOUNTER FOR INITIAL PRESCRIPTION OF CONTRACEPTIVE PILLS: ICD-10-CM

## 2020-10-21 PROCEDURE — 76817 TRANSVAGINAL US OBSTETRIC: CPT | Performed by: OBSTETRICS & GYNECOLOGY

## 2020-10-21 PROCEDURE — 99213 OFFICE O/P EST LOW 20 MIN: CPT | Performed by: OBSTETRICS & GYNECOLOGY

## 2020-10-21 RX ORDER — NORETHINDRONE ACETATE AND ETHINYL ESTRADIOL AND FERROUS FUMARATE 1MG-20(24)
1 KIT ORAL DAILY
Qty: 28 TABLET | Refills: 12 | Status: SHIPPED | OUTPATIENT
Start: 2020-10-21 | End: 2020-12-23 | Stop reason: SDUPTHER

## 2020-10-21 NOTE — PROGRESS NOTES
10/21/2020      Patient:  Maritza Asencio   MR#:[id number]    Office note    Chief Complaint   Patient presents with   • Follow-up     confirm viability last pap 19       Subjective     History of Present Illness  45 y.o. female  presents for follow-up visit for confirmation of viability.  The patient had onset of spotting Saturday and then heavy bleeding  and passed tissue on Monday/Tuesday.  The patient is having normal menstrual flow with light cramps at this time.  Repeat ultrasound shows a prominent endometrium at 12 mm but no evidence of retained products per se or an intrauterine gestational sac.    The pregnancy was welcome but unplanned but the patient is requesting contraception at this time.    Patient Active Problem List   Diagnosis   • Essential hypertension   • Dyslipidemia   • Anxiety   • Renal duplicate collecting system, right   • Right ureteral stone   • Morbid obesity with BMI of 50.0-59.9, adult (CMS/[facility])   • Pregnancy with uncertain fetal viability       Past Medical History:   Diagnosis Date   • Anemia in pregnancy 3/23/2016   • Anxiety    • Depression    • Hyperlipidemia    • Hypertension    • Migraine    • Pulmonary edema, acute (CMS/[facility]) 2016    Postpartum day#10, EKG nl, Echo normal EF, , Sx improved with lasix.    • UTI (urinary tract infection) during pregnancy 10/19/2015    E-coli     Past Surgical History:   Procedure Laterality Date   • ANAL FISSURECTOMY  2018   •  SECTION N/A 2016    Procedure:  SECTION PRIMARY;  Surgeon: Xander Rodgers MD;  Location: [facility] LABOR DELIVERY;  Service:    • CHOLECYSTECTOMY     • HEMORRHOIDECTOMY  2018     Obstetric History:  OB History        3    Para   1    Term   1            AB   1    Living   1       SAB   1    TAB        Ectopic        Molar        Multiple   0    Live Births   1               Menstrual History:     Patient's last menstrual period was 08/10/2020.        # 1 - Date: 2009, Sex: None, Weight: None, GA: 5w0d, Delivery: Spontaneous , Apgar1: None, Apgar5: None, Living: Fetal Demise, Birth Comments: None    # 2 - Date: 16, Sex: Female, Weight: 3595 g (7 lb 14.8 oz), GA: 39w1d, Delivery: , Low Transverse, Apgar1: 9, Apgar5: 9, Living: Living, Birth Comments: None    # 3 - Date: None, Sex: None, Weight: None, GA: None, Delivery: None, Apgar1: None, Apgar5: None, Living: None, Birth Comments: None    Family History   Problem Relation Age of Onset   • Heart disease Father    • Diabetes Father    • Heart attack Father          at age 52   • Hyperlipidemia Father    • Hypertension Mother    • Multiple sclerosis Sister    • No Known Problems Daughter    • Breast cancer Neg Hx      Social History     Tobacco Use   • Smoking status: Never Smoker   • Smokeless tobacco: Never Used   Substance Use Topics   • Alcohol use: No   • Drug use: No     Patient has no known allergies.    Current Outpatient Medications:   •  Loratadine (CLARITIN PO), Take 10 mg by mouth daily., Disp: , Rfl:   •  methyldopa (ALDOMET) 250 MG tablet, , Disp: , Rfl:   •  ondansetron ODT (Zofran ODT) 4 MG disintegrating tablet, Place 1 tablet on the tongue Every 6 (Six) Hours As Needed for Nausea or Vomiting., Disp: 15 tablet, Rfl: 0  •  Prenatal Vit-Fe Fumarate-FA (Prenatal 27-1) 27-1 MG tablet tablet, , Disp: , Rfl:   •  topiramate (TOPAMAX) 100 MG tablet, Daily., Disp: , Rfl:   •  norethindrone-ethinyl estradiol-ferrous fumarate (LOESTIN 24 FE) 1-20 MG-MCG(24) per tablet, Take 1 tablet by mouth Daily., Disp: 28 tablet, Rfl: 12    The following portions of the patient's history were reviewed and updated as appropriate: allergies, current medications, past family history, past medical history, past social history, past surgical history and problem list.    Review of Systems   Constitutional: Negative.    Respiratory: Negative.    Cardiovascular: Negative.    Gastrointestinal:  "Negative.    Genitourinary: Positive for menstrual problem and vaginal bleeding.   Psychiatric/Behavioral: Negative.        BP Readings from Last 3 Encounters:   10/21/20 140/76   10/14/20 128/74   20 136/84      Wt Readings from Last 3 Encounters:   10/21/20 (!) 143 kg (315 lb 9.6 oz)   10/14/20 (!) 143 kg (315 lb)   20 (!) 138 kg (305 lb)      BMI: Estimated body mass index is 54.15 kg/m² as calculated from the following:    Height as of this encounter: 162.6 cm (64.02\").    Weight as of this encounter: 143 kg (315 lb 9.6 oz). BSA: Estimated body surface area is 2.37 meters squared as calculated from the following:    Height as of this encounter: 162.6 cm (64.02\").    Weight as of this encounter: 143 kg (315 lb 9.6 oz).    Objective   Physical Exam  Vitals signs and nursing note reviewed.   Constitutional:       Appearance: She is well-developed. She is obese.   HENT:      Head: Normocephalic and atraumatic.   Cardiovascular:      Rate and Rhythm: Normal rate.   Pulmonary:      Effort: Pulmonary effort is normal.   Abdominal:      General: Bowel sounds are normal. There is no distension.      Palpations: Abdomen is soft.      Tenderness: There is no abdominal tenderness.   Skin:     General: Skin is warm and dry.   Neurological:      Mental Status: She is alert and oriented to person, place, and time.   Psychiatric:         Behavior: Behavior normal.         Thought Content: Thought content normal.         Judgment: Judgment normal.       Assessment/Plan     Diagnoses and all orders for this visit:    1. Complete  (Primary)    2. Encounter for initial prescription of contraceptive pills  -     norethindrone-ethinyl estradiol-ferrous fumarate (LOESTIN 24 FE) 1-20 MG-MCG(24) per tablet; Take 1 tablet by mouth Daily.  Dispense: 28 tablet; Refill: 12          Return if symptoms worsen or fail to improve, for Annual GYN exam.    Xander Rodgers MD   10/21/2020 09:28 EDT  "

## 2020-12-23 ENCOUNTER — OFFICE VISIT (OUTPATIENT)
Dept: OBSTETRICS AND GYNECOLOGY | Age: 45
End: 2020-12-23

## 2020-12-23 VITALS
DIASTOLIC BLOOD PRESSURE: 72 MMHG | WEIGHT: 293 LBS | SYSTOLIC BLOOD PRESSURE: 128 MMHG | HEIGHT: 64 IN | BODY MASS INDEX: 50.02 KG/M2

## 2020-12-23 DIAGNOSIS — Z12.31 SCREENING MAMMOGRAM, ENCOUNTER FOR: ICD-10-CM

## 2020-12-23 DIAGNOSIS — Z01.419 WELL FEMALE EXAM WITH ROUTINE GYNECOLOGICAL EXAM: Primary | ICD-10-CM

## 2020-12-23 DIAGNOSIS — Z12.4 SCREENING FOR MALIGNANT NEOPLASM OF THE CERVIX: ICD-10-CM

## 2020-12-23 DIAGNOSIS — Z00.00 ENCOUNTER FOR ANNUAL PHYSICAL EXAM: ICD-10-CM

## 2020-12-23 DIAGNOSIS — Z30.011 ENCOUNTER FOR INITIAL PRESCRIPTION OF CONTRACEPTIVE PILLS: ICD-10-CM

## 2020-12-23 DIAGNOSIS — Z11.51 SPECIAL SCREENING EXAMINATION FOR HUMAN PAPILLOMAVIRUS (HPV): ICD-10-CM

## 2020-12-23 PROBLEM — E78.5 HYPERLIPIDEMIA: Status: ACTIVE | Noted: 2018-01-15

## 2020-12-23 PROBLEM — F41.9 ANXIETY: Status: RESOLVED | Noted: 2019-03-05 | Resolved: 2020-12-23

## 2020-12-23 PROBLEM — O36.80X0 PREGNANCY WITH UNCERTAIN FETAL VIABILITY: Status: RESOLVED | Noted: 2020-10-14 | Resolved: 2020-12-23

## 2020-12-23 PROCEDURE — 99396 PREV VISIT EST AGE 40-64: CPT | Performed by: OBSTETRICS & GYNECOLOGY

## 2020-12-23 RX ORDER — ATENOLOL 25 MG/1
25 TABLET ORAL 2 TIMES DAILY
COMMUNITY

## 2020-12-23 RX ORDER — NORETHINDRONE ACETATE AND ETHINYL ESTRADIOL AND FERROUS FUMARATE 1MG-20(24)
1 KIT ORAL DAILY
Qty: 28 TABLET | Refills: 12 | Status: SHIPPED | OUTPATIENT
Start: 2020-12-23 | End: 2021-12-29

## 2020-12-23 NOTE — PROGRESS NOTES
Routine Annual Visit    2020    Patient: Maritza Asencio          MR#:[id number]    History of Present Illness    Chief Complaint   Patient presents with   • Gynecologic Exam     cc: annual visit today , last pap 19 neg , last mmg 2018 neg , no complaints today , periods are regular        45 y.o. female  who presents for annual exam.    The patient presents for routine annual exam reporting some periodic left upper quadrant pain and thinks she might have a hernia.  We talked about various hernias and the patient is concerned that she may have a hiatal hernia.  The patient is interested in pursuing weight loss surgery.  She plans to talk with her primary care doctor and consider a GI referral        Patient's last menstrual period was 2020 (approximate).  Obstetric History:  OB History        3    Para   1    Term   1            AB   1    Living   1       SAB   1    TAB        Ectopic        Molar        Multiple   0    Live Births   1               Menstrual History:     Patient's last menstrual period was 2020 (approximate).       Sexual History:       ________________________________________  Patient Active Problem List   Diagnosis   • Essential hypertension   • Dyslipidemia   • Renal duplicate collecting system, right   • Right ureteral stone   • Morbid obesity with BMI of 50.0-59.9, adult (CMS/[facility])   • Anxiety   • Hyperlipidemia     Past Medical History:   Diagnosis Date   • Anemia in pregnancy 3/23/2016   • Anxiety    • Depression    • Hyperlipidemia    • Hypertension    • Migraine    • Pulmonary edema, acute (CMS/[facility]) 2016    Postpartum day#10, EKG nl, Echo normal EF, , Sx improved with lasix.    • UTI (urinary tract infection) during pregnancy 10/19/2015    E-coli     Past Surgical History:   Procedure Laterality Date   • ANAL FISSURECTOMY  2018   •  SECTION N/A 2016    Procedure:  SECTION PRIMARY;  Surgeon: Xander Rodgers,  MD;  Location: [facility] LABOR DELIVERY;  Service:    • CHOLECYSTECTOMY     • HEMORRHOIDECTOMY  2018     Social History     Tobacco Use   Smoking Status Never Smoker   Smokeless Tobacco Never Used     Family History   Problem Relation Age of Onset   • Heart disease Father    • Diabetes Father    • Heart attack Father          at age 52   • Hyperlipidemia Father    • Hypertension Mother    • Multiple sclerosis Sister    • No Known Problems Daughter    • Breast cancer Neg Hx      Prior to Admission medications    Medication Sig Start Date End Date Taking? Authorizing Provider   atenolol (TENORMIN) 25 MG tablet Take 25 mg by mouth 2 (two) times a day.   Yes Kerri Ruiz MD   Loratadine (CLARITIN PO) Take 10 mg by mouth daily.   Yes Kerri Ruiz MD   norethindrone-ethinyl estradiol-ferrous fumarate (LOESTIN 24 FE) 1-20 MG-MCG(24) per tablet Take 1 tablet by mouth Daily. 10/21/20 10/21/21 Yes Xander Rodgers MD   topiramate (TOPAMAX) 100 MG tablet Daily. 17  Yes Kerri Ruiz MD   methyldopa (ALDOMET) 250 MG tablet  10/5/20   Kerri Ruiz MD   ondansetron ODT (Zofran ODT) 4 MG disintegrating tablet Place 1 tablet on the tongue Every 6 (Six) Hours As Needed for Nausea or Vomiting. 20   Valerie Batista APRN   Prenatal Vit-Fe Fumarate-FA (Prenatal 27-1) 27-1 MG tablet tablet  9/15/20   ProviderKerri MD     ________________________________________    Current contraception: OCP (estrogen/progesterone)  History of abnormal Pap smear: no  Family history of uterine or ovarian cancer: no  Family History of colon cancer/colon polyps: no  History of abnormal mammogram: no  History of abnormal lipids: yes - untreated    The following portions of the patient's history were reviewed and updated as appropriate: allergies, current medications, past family history, past medical history, past social history, past surgical history and problem list.    Review of  "Systems    Pertinent items are noted in HPI.       Objective   Physical Exam    /72   Ht 162.6 cm (64.02\")   Wt (!) 141 kg (310 lb)   LMP 12/02/2020 (Approximate)   Breastfeeding No   BMI 53.18 kg/m²    BP Readings from Last 3 Encounters:   12/23/20 128/72   10/21/20 140/76   10/14/20 128/74      Wt Readings from Last 3 Encounters:   12/23/20 (!) 141 kg (310 lb)   10/21/20 (!) 143 kg (315 lb 9.6 oz)   10/14/20 (!) 143 kg (315 lb)        BMI: Estimated body mass index is 53.18 kg/m² as calculated from the following:    Height as of this encounter: 162.6 cm (64.02\").    Weight as of this encounter: 141 kg (310 lb).       General: alert, appears stated age, cooperative and morbidly obese   Heart: regular rate and rhythm, S1, S2 normal, no murmur, click, rub or gallop   Lungs: clear to auscultation bilaterally   Abdomen: soft, non-tender, without masses, no organomegaly   Breast: inspection negative, no nipple discharge or bleeding, no masses or nodularity palpable   External genitalia/Vulva: External genitalia including bartholin's glands, Urethra, Howells's gland and urethra meatus are normal, Perineum, rectum and anus appear normal  and Bladder appears normal without significant prolapse    Vagina: normal mucosa, normal discharge   Cervix: no lesions and nulliparous appearance   Uterus: normal size or exam is limited habitus    Adnexa: exam limited by habitus     As part of wellness and prevention, the following topics were discussed with the patient:  Encouraged self breast exam  Physical activity and regular exercised encouraged.       Assessment:    Diagnoses and all orders for this visit:    1. Well female exam with routine gynecological exam (Primary)    2. Screening for malignant neoplasm of the cervix  -     IgP, Aptima HPV    3. Special screening examination for human papillomavirus (HPV)  -     IgP, Aptima HPV    4. Encounter for annual physical exam  -     IgP, Aptima HPV    5. Screening mammogram, " encounter for  -     Mammo Screening Digital Tomosynthesis Bilateral With CAD; Future    6. Encounter for initial prescription of contraceptive pills  -     norethindrone-ethinyl estradiol-ferrous fumarate (LOESTIN 24 FE) 1-20 MG-MCG(24) per tablet; Take 1 tablet by mouth Daily.  Dispense: 28 tablet; Refill: 12        Plan:  Return in about 1 year (around 12/23/2021) for Annual GYN exam.      Xander Rodgers MD  12/23/2020 13:42 EST

## 2021-02-17 ENCOUNTER — HOSPITAL ENCOUNTER (OUTPATIENT)
Dept: MAMMOGRAPHY | Facility: HOSPITAL | Age: 46
Discharge: HOME | End: 2021-02-17
Admitting: OBSTETRICS & GYNECOLOGY

## 2021-02-17 DIAGNOSIS — Z12.31 SCREENING MAMMOGRAM, ENCOUNTER FOR: ICD-10-CM

## 2021-02-17 PROCEDURE — 77067 SCR MAMMO BI INCL CAD: CPT

## 2021-02-17 PROCEDURE — 77063 BREAST TOMOSYNTHESIS BI: CPT

## 2021-04-02 ENCOUNTER — BULK ORDERING (OUTPATIENT)
Dept: CASE MANAGEMENT | Facility: OTHER | Age: 46
End: 2021-04-02

## 2021-04-02 DIAGNOSIS — Z23 IMMUNIZATION DUE: ICD-10-CM

## 2021-12-29 ENCOUNTER — OFFICE VISIT (OUTPATIENT)
Dept: OBSTETRICS AND GYNECOLOGY | Age: 46
End: 2021-12-29

## 2021-12-29 VITALS
HEIGHT: 64 IN | DIASTOLIC BLOOD PRESSURE: 74 MMHG | WEIGHT: 252 LBS | SYSTOLIC BLOOD PRESSURE: 122 MMHG | BODY MASS INDEX: 43.02 KG/M2

## 2021-12-29 DIAGNOSIS — Z01.419 WELL WOMAN EXAM WITH ROUTINE GYNECOLOGICAL EXAM: Primary | ICD-10-CM

## 2021-12-29 DIAGNOSIS — Z12.31 SCREENING MAMMOGRAM, ENCOUNTER FOR: ICD-10-CM

## 2021-12-29 DIAGNOSIS — I10 ESSENTIAL HYPERTENSION: ICD-10-CM

## 2021-12-29 DIAGNOSIS — E66.01 MORBID OBESITY WITH BMI OF 40.0-44.9, ADULT (HCC): ICD-10-CM

## 2021-12-29 DIAGNOSIS — Z13.89 SCREENING FOR BLOOD OR PROTEIN IN URINE: ICD-10-CM

## 2021-12-29 DIAGNOSIS — Z12.4 SCREENING FOR MALIGNANT NEOPLASM OF THE CERVIX: ICD-10-CM

## 2021-12-29 DIAGNOSIS — Z11.51 SPECIAL SCREENING EXAMINATION FOR HUMAN PAPILLOMAVIRUS (HPV): ICD-10-CM

## 2021-12-29 PROBLEM — Z98.84 GASTRIC BYPASS STATUS FOR OBESITY: Status: ACTIVE | Noted: 2021-09-08

## 2021-12-29 PROBLEM — N20.1 RIGHT URETERAL STONE: Status: RESOLVED | Noted: 2020-07-08 | Resolved: 2021-12-29

## 2021-12-29 LAB
BILIRUB BLD-MCNC: ABNORMAL MG/DL
CLARITY, POC: CLEAR
COLOR UR: YELLOW
GLUCOSE UR STRIP-MCNC: NEGATIVE MG/DL
KETONES UR QL: NEGATIVE
LEUKOCYTE EST, POC: ABNORMAL
NITRITE UR-MCNC: NEGATIVE MG/ML
PH UR: 6 [PH] (ref 5–8)
PROT UR STRIP-MCNC: ABNORMAL MG/DL
RBC # UR STRIP: ABNORMAL /UL
SP GR UR: 1.02 (ref 1–1.03)
UROBILINOGEN UR QL: NORMAL

## 2021-12-29 PROCEDURE — 99396 PREV VISIT EST AGE 40-64: CPT | Performed by: OBSTETRICS & GYNECOLOGY

## 2021-12-29 PROCEDURE — 81002 URINALYSIS NONAUTO W/O SCOPE: CPT | Performed by: OBSTETRICS & GYNECOLOGY

## 2021-12-29 RX ORDER — NYSTATIN 100000 [USP'U]/G
1 POWDER TOPICAL 2 TIMES DAILY
COMMUNITY
Start: 2021-04-13 | End: 2022-04-13

## 2021-12-29 RX ORDER — PEDI MULTIVIT NO.25/FOLIC ACID 300 MCG
TABLET,CHEWABLE ORAL
COMMUNITY
End: 2023-03-06

## 2021-12-29 RX ORDER — BACLOFEN 10 MG/1
10 TABLET ORAL 2 TIMES DAILY
COMMUNITY
Start: 2021-11-08 | End: 2023-03-06

## 2021-12-29 NOTE — PROGRESS NOTES
[facility]   Obstetrics and Gynecology       2021    Patient: Maritza Asencio          MR#:[id number]    History of Present Illness    Chief Complaint   Patient presents with   • Gynecologic Exam     last pap 2020 neg, last mg 2021       46 y.o. female  who presents for annual exam.    The patient presents for her regular check feeling well without complaints.  The patient had gastric bypass surgery and has lost approximately 70 pounds.    Studies reviewed:    Patient's last menstrual period was 12/15/2021 (within days).  Obstetric History:  OB History        3    Para   1    Term   1            AB   1    Living   1       SAB   1    IAB        Ectopic        Molar        Multiple   0    Live Births   1               Menstrual History:     Patient's last menstrual period was 12/15/2021 (within days).       Sexual History:       ________________________________________  Patient Active Problem List   Diagnosis   • Essential hypertension   • Dyslipidemia   • Renal duplicate collecting system, right   • Anxiety   • Hyperlipidemia   • Gastric bypass status for obesity   • Morbid obesity with BMI of 40.0-44.9, adult ([facility])     Past Medical History:   Diagnosis Date   • Anemia in pregnancy 3/23/2016   • Anxiety    • Depression    • Hyperlipidemia    • Hypertension    • Migraine    • Pulmonary edema, acute ([facility]) 2016    Postpartum day#10, EKG nl, Echo normal EF, , Sx improved with lasix.    • UTI (urinary tract infection) during pregnancy 10/19/2015    E-coli     Past Surgical History:   Procedure Laterality Date   • ANAL FISSURECTOMY  2018   •  SECTION N/A 2016    Procedure:  SECTION PRIMARY;  Surgeon: Xander Rodgers MD;  Location: [facility] LABOR DELIVERY;  Service:    • CHOLECYSTECTOMY     • GASTRIC BYPASS  2021   • HEMORRHOIDECTOMY  2018     Social History     Tobacco Use   Smoking Status Never Smoker   Smokeless Tobacco Never  Used     Family History   Problem Relation Age of Onset   • Heart disease Father    • Diabetes Father    • Heart attack Father          at age 52   • Hyperlipidemia Father    • Hypertension Mother    • Multiple sclerosis Sister    • No Known Problems Daughter    • Breast cancer Neg Hx      Prior to Admission medications    Medication Sig Start Date End Date Taking? Authorizing Provider   atenolol (TENORMIN) 25 MG tablet Take 25 mg by mouth 2 (two) times a day.   Yes Kerri Ruiz MD   baclofen (LIORESAL) 10 MG tablet Take 10 mg by mouth 2 (Two) Times a Day. 21  Yes Kerri Ruiz MD   Calcium Carbonate-Vit D-Min (CALCIUM 1200 PO) Take  by mouth.   Yes Kerri Ruiz MD   hydrocortisone 2.5 % cream Apply 1 application topically to the appropriate area as directed 2 (Two) Times a Day.   Yes Kerri Ruiz MD   Loratadine (CLARITIN PO) Take 10 mg by mouth daily.   Yes Kerri Ruiz MD   nystatin (MYCOSTATIN) 781751 UNIT/GM powder Apply 1 application topically to the appropriate area as directed 2 (Two) Times a Day. 21 Yes Kerri Ruiz MD   Pediatric Multiple Vit-C-FA (pediatric multivitamin) chewable tablet Chew.   Yes Kerri Ruiz MD   norethindrone-ethinyl estradiol-ferrous fumarate (LOESTIN 24 FE) 1-20 MG-MCG(24) per tablet Take 1 tablet by mouth Daily. 20  Xander Rodgers MD   topiramate (TOPAMAX) 100 MG tablet Daily. 17  Kerri Ruiz MD     ________________________________________    Current contraception: condoms  History of abnormal Pap smear: no  Family history of uterine or ovarian cancer: no  Family History of colon cancer/colon polyps: no  History of abnormal mammogram: no  History of abnormal lipids: no    The following portions of the patient's history were reviewed and updated as appropriate: allergies, current medications, past family history, past medical history, past social history, past  "surgical history and problem list.    Review of Systems    Pertinent items are noted in HPI.       Objective   Physical Exam    /74   Ht 162.6 cm (64\")   Wt 114 kg (252 lb)   LMP 12/15/2021 (Within Days)   Breastfeeding No   BMI 43.26 kg/m²    BP Readings from Last 3 Encounters:   12/29/21 122/74   12/23/20 128/72   10/21/20 140/76      Wt Readings from Last 3 Encounters:   12/29/21 114 kg (252 lb)   12/23/20 (!) 141 kg (310 lb)   10/21/20 (!) 143 kg (315 lb 9.6 oz)        BMI: Estimated body mass index is 43.26 kg/m² as calculated from the following:    Height as of this encounter: 162.6 cm (64\").    Weight as of this encounter: 114 kg (252 lb).       General: alert, appears stated age and cooperative   Heart: regular rate and rhythm, S1, S2 normal, no murmur, click, rub or gallop   Lungs: clear to auscultation bilaterally   Abdomen: Fairly significant pannus and soft, non-tender, without masses, no organomegaly   Breast: inspection negative, no nipple discharge or bleeding, no masses or nodularity palpable   External genitalia/Vulva: External genitalia including bartholin's glands, Urethra, [place]'s gland and urethra meatus are normal, Perineum, rectum and anus appear normal  and Bladder appears normal without significant prolapse    Vagina: normal mucosa, normal discharge   Cervix: no lesions   Uterus: normal size, mobile, non-tender and exam is limited habitus    Adnexa: exam limited by habitus     As part of wellness and prevention, the following topics were discussed with the patient:  Encouraged self breast exam  Physical activity and regular exercised encouraged.   Contraception discussed.       Assessment:    Diagnoses and all orders for this visit:    1. Well woman exam with routine gynecological exam (Primary)  -     IgP, Aptima HPV    2. Screening for blood or protein in urine  -     POC Urinalysis Dipstick    3. Screening for malignant neoplasm of the cervix  -     IgP, Aptima HPV    4. " Special screening examination for human papillomavirus (HPV)  -     IgP, Aptima HPV    5. Morbid obesity with BMI of 40.0-44.9, adult (HCC)    6. Essential hypertension    7. Screening mammogram, encounter for  -     Mammo Screening Digital Tomosynthesis Bilateral With CAD; Future        Plan:  Return in about 1 year (around 12/29/2022) for Annual GYN exam.      Xander Rodgers MD  12/29/2021 13:27 EST

## 2022-01-04 LAB
CYTOLOGIST CVX/VAG CYTO: NORMAL
CYTOLOGY CVX/VAG DOC CYTO: NORMAL
CYTOLOGY CVX/VAG DOC THIN PREP: NORMAL
DX ICD CODE: NORMAL
HIV 1 & 2 AB SER-IMP: NORMAL
HPV I/H RISK 4 DNA CVX QL PROBE+SIG AMP: NEGATIVE
OTHER STN SPEC: NORMAL
PATHOLOGIST CVX/VAG CYTO: NORMAL
STAT OF ADQ CVX/VAG CYTO-IMP: NORMAL

## 2022-01-25 RX ORDER — NORETHINDRONE ACETATE AND ETHINYL ESTRADIOL 1; .02 MG/1; MG/1
1 TABLET ORAL DAILY
Qty: 21 TABLET | Refills: 12 | Status: SHIPPED | OUTPATIENT
Start: 2022-01-25 | End: 2023-03-06

## 2022-02-22 ENCOUNTER — APPOINTMENT (OUTPATIENT)
Dept: MAMMOGRAPHY | Facility: HOSPITAL | Age: 47
End: 2022-02-22

## 2022-02-25 ENCOUNTER — HOSPITAL ENCOUNTER (OUTPATIENT)
Dept: MAMMOGRAPHY | Facility: HOSPITAL | Age: 47
Discharge: HOME | End: 2022-02-25
Admitting: OBSTETRICS & GYNECOLOGY

## 2022-02-25 DIAGNOSIS — Z12.31 SCREENING MAMMOGRAM, ENCOUNTER FOR: ICD-10-CM

## 2022-02-25 PROCEDURE — 77067 SCR MAMMO BI INCL CAD: CPT

## 2022-02-25 PROCEDURE — 77063 BREAST TOMOSYNTHESIS BI: CPT

## 2023-01-11 ENCOUNTER — OFFICE VISIT (OUTPATIENT)
Dept: OBSTETRICS AND GYNECOLOGY | Age: 48
End: 2023-01-11
Payer: COMMERCIAL

## 2023-01-11 VITALS
DIASTOLIC BLOOD PRESSURE: 68 MMHG | BODY MASS INDEX: 28.58 KG/M2 | WEIGHT: 167.4 LBS | SYSTOLIC BLOOD PRESSURE: 106 MMHG | HEIGHT: 64 IN

## 2023-01-11 DIAGNOSIS — Z12.31 SCREENING MAMMOGRAM FOR BREAST CANCER: ICD-10-CM

## 2023-01-11 DIAGNOSIS — Z01.419 WELL WOMAN EXAM WITH ROUTINE GYNECOLOGICAL EXAM: Primary | ICD-10-CM

## 2023-01-11 PROCEDURE — 99396 PREV VISIT EST AGE 40-64: CPT | Performed by: NURSE PRACTITIONER

## 2023-01-11 RX ORDER — ESCITALOPRAM OXALATE 10 MG/1
10 TABLET ORAL DAILY
COMMUNITY
Start: 2022-07-22 | End: 2023-03-06

## 2023-01-11 RX ORDER — BUSPIRONE HYDROCHLORIDE 30 MG/1
30 TABLET ORAL
COMMUNITY
Start: 2022-11-16

## 2023-01-11 RX ORDER — OMEPRAZOLE 40 MG/1
40 CAPSULE, DELAYED RELEASE ORAL
COMMUNITY
Start: 2022-12-29 | End: 2023-03-06

## 2023-01-11 RX ORDER — PRENATAL VIT NO.126/IRON/FOLIC 28MG-0.8MG
1 TABLET ORAL DAILY
COMMUNITY

## 2023-01-11 NOTE — PROGRESS NOTES
[facility]  Routine Annual Visit    2023    Patient: Maritza Asencio          MR#:[id number]      History of Present Illness    47 y.o. female  who presents for annual exam without GYN complaint.     She is having regular, monthly cycles. She and her  are using condoms for contraception but she is interested in LARC.     Patient's last menstrual period was 2023 (exact date).  Obstetric History:  OB History        3    Para   1    Term   1            AB   1    Living   1       SAB   1    IAB        Ectopic        Molar        Multiple   0    Live Births   1               Menstrual History:     Patient's last menstrual period was 2023 (exact date).       Sexual History:       ________________________________________  Patient Active Problem List   Diagnosis   • Essential hypertension   • Dyslipidemia   • Renal duplicate collecting system, right   • Anxiety   • Hyperlipidemia   • Gastric bypass status for obesity   • Morbid obesity with BMI of 40.0-44.9, adult ([facility])       Past Medical History:   Diagnosis Date   • Anemia in pregnancy 3/23/2016   • Anxiety    • Depression    • Hyperlipidemia    • Hypertension    • Migraine    • Pulmonary edema, acute ([facility]) 2016    Postpartum day#10, EKG nl, Echo normal EF, , Sx improved with lasix.    • UTI (urinary tract infection) during pregnancy 10/19/2015    E-coli       Past Surgical History:   Procedure Laterality Date   • ANAL FISSURECTOMY  2018   •  SECTION N/A 2016    Procedure:  SECTION PRIMARY;  Surgeon: Xander Rodgers MD;  Location: [facility] LABOR DELIVERY;  Service:    • CHOLECYSTECTOMY     • GASTRIC BYPASS  2021   • HEMORRHOIDECTOMY  2018       Social History     Tobacco Use   Smoking Status Never   Smokeless Tobacco Never       Family History   Problem Relation Age of Onset   • Heart disease Father    • Diabetes Father    • Heart attack Father          at age  52   • Hyperlipidemia Father    • Hypertension Mother    • Multiple sclerosis Sister    • No Known Problems Daughter    • Breast cancer Neg Hx        Prior to Admission medications    Medication Sig Start Date End Date Taking? Authorizing Provider   atenolol (TENORMIN) 25 MG tablet Take 25 mg by mouth 2 (two) times a day.   Yes Kerri Ruiz MD   busPIRone (BUSPAR) 30 MG tablet Take 30 mg by mouth. 11/16/22  Yes Kerri Ruiz MD   Calcium Carbonate-Vit D-Min (CALCIUM 1200 PO) Take  by mouth.   Yes Kerri Ruiz MD   escitalopram (LEXAPRO) 10 MG tablet Take 10 mg by mouth Daily. 7/22/22 1/18/23 Yes Kerri Ruiz MD   Loratadine (CLARITIN PO) Take 10 mg by mouth daily.   Yes Kerri Ruiz MD   omeprazole (priLOSEC) 40 MG capsule Take 40 mg by mouth. 12/29/22  Yes Kerri Ruiz MD   prenatal vitamin (prenatal, CLASSIC, vitamin) tablet Take 1 tablet by mouth Daily.   Yes Kerri Ruiz MD   baclofen (LIORESAL) 10 MG tablet Take 10 mg by mouth 2 (Two) Times a Day. 11/8/21   Kerri Ruiz MD   hydrocortisone 2.5 % cream Apply 1 application topically to the appropriate area as directed 2 (Two) Times a Day.    Kerri Ruiz MD   norethindrone-ethinyl estradiol (MICROGESTIN) 1-20 MG-MCG per tablet Take 1 tablet by mouth Daily. 1/25/22 1/25/23  Xander Rodgers MD   Pediatric Multiple Vit-C-FA (pediatric multivitamin) chewable tablet Chew.    Kerri Ruiz MD     ________________________________________    The following portions of the patient's history were reviewed and updated as appropriate: allergies, current medications, past family history, past medical history, past social history, past surgical history and problem list.    Review of Systems   Constitutional: Negative.    HENT: Negative.    Eyes: Negative for visual disturbance.   Respiratory: Negative for cough, shortness of breath and wheezing.    Cardiovascular: Negative for chest pain,  "palpitations and leg swelling.   Gastrointestinal: Negative for abdominal distention, abdominal pain, blood in stool, constipation, diarrhea, nausea and vomiting.   Endocrine: Negative for cold intolerance and heat intolerance.   Genitourinary: Negative for difficulty urinating, dyspareunia, dysuria, frequency, genital sores, hematuria, menstrual problem, pelvic pain, urgency, vaginal bleeding, vaginal discharge and vaginal pain.   Musculoskeletal: Negative.    Skin: Negative.    Neurological: Negative for dizziness, weakness, light-headedness, numbness and headaches.   Hematological: Negative.    Psychiatric/Behavioral: Negative.    Breasts: negative for lumps skin changes, dimpling, swelling, nipple changes/discharge bilaterally       Objective   Physical Exam    /68   Ht 162.6 cm (64\")   Wt 75.9 kg (167 lb 6.4 oz)   LMP 01/05/2023 (Exact Date)   BMI 28.73 kg/m²    BP Readings from Last 3 Encounters:   01/11/23 106/68   12/29/21 122/74   12/23/20 128/72      Wt Readings from Last 3 Encounters:   01/11/23 75.9 kg (167 lb 6.4 oz)   12/29/21 114 kg (252 lb)   12/23/20 (!) 141 kg (310 lb)        BMI: Estimated body mass index is 28.73 kg/m² as calculated from the following:    Height as of this encounter: 162.6 cm (64\").    Weight as of this encounter: 75.9 kg (167 lb 6.4 oz).            General:   alert, appears stated age and cooperative   Heart: regular rate and rhythm, S1, S2 normal, no murmur, click, rub or gallop   Lungs: clear to auscultation bilaterally   Abdomen: soft, non-tender, without masses or organomegaly   Breast: inspection negative, no nipple discharge or bleeding, no masses or nodularity palpable   Vulva: External genitalia including bartholin's glands, Urethra, [place]'s gland and urethra meatus are normal, Perineum, rectum and anus appear normal  and Bladder appears normal without significant prolapse    Vagina: normal mucosa, normal discharge   Cervix: no cervical motion tenderness and no " lesions   Uterus: normal size, mobile and non-tender   Adnexa: normal adnexa     Assessment:    Diagnoses and all orders for this visit:    1. Well woman exam with routine gynecological exam (Primary)  -     IgP, Aptima HPV    2. Screening mammogram for breast cancer  -     Mammo Screening Digital Tomosynthesis Bilateral With CAD; Future      Healthy lifestyle modifications discussed, counseled on self breast exams and bone health    Contraception- all options discussed in detail. Maritza desires mirena. Risks, benefits, possible side effects, and insertion technique discussed in detail. Advised no unprotected IC until insertion as can be no chance of pregnancy. Recommended premedicate with 600 mg ibuprofen. We will get her precerted for this and she will return on her menses for insertion.       All of the patient's questions were addressed and answered, I have encouraged her to call for today's test results if she has not received them within 10 days.  Patient is advised to call with any change in her condition or with any other questions, otherwise return in 12 months for annual examination.        Barbara Oro, APRN  1/11/2023 09:18 EST

## 2023-02-27 ENCOUNTER — HOSPITAL ENCOUNTER (OUTPATIENT)
Dept: MAMMOGRAPHY | Facility: HOSPITAL | Age: 48
Discharge: HOME | End: 2023-02-27
Admitting: NURSE PRACTITIONER
Payer: COMMERCIAL

## 2023-02-27 DIAGNOSIS — Z12.31 SCREENING MAMMOGRAM FOR BREAST CANCER: ICD-10-CM

## 2023-02-27 PROCEDURE — 77067 SCR MAMMO BI INCL CAD: CPT

## 2023-02-27 PROCEDURE — 77063 BREAST TOMOSYNTHESIS BI: CPT

## 2023-03-06 ENCOUNTER — OFFICE VISIT (OUTPATIENT)
Dept: OBSTETRICS AND GYNECOLOGY | Age: 48
End: 2023-03-06
Payer: COMMERCIAL

## 2023-03-06 VITALS
WEIGHT: 169 LBS | SYSTOLIC BLOOD PRESSURE: 118 MMHG | DIASTOLIC BLOOD PRESSURE: 72 MMHG | HEIGHT: 64 IN | BODY MASS INDEX: 28.85 KG/M2

## 2023-03-06 DIAGNOSIS — Z30.430 ENCOUNTER FOR IUD INSERTION: ICD-10-CM

## 2023-03-06 DIAGNOSIS — K43.2 INCISIONAL HERNIA WITHOUT OBSTRUCTION OR GANGRENE: ICD-10-CM

## 2023-03-06 DIAGNOSIS — Z13.89 SCREENING FOR BLOOD OR PROTEIN IN URINE: ICD-10-CM

## 2023-03-06 DIAGNOSIS — Z11.3 SCREENING EXAMINATION FOR STD (SEXUALLY TRANSMITTED DISEASE): Primary | ICD-10-CM

## 2023-03-06 DIAGNOSIS — Z01.812 PRE-PROCEDURE LAB EXAM: ICD-10-CM

## 2023-03-06 PROBLEM — Z97.5 IUD (INTRAUTERINE DEVICE) IN PLACE: Status: ACTIVE | Noted: 2023-03-06

## 2023-03-06 PROBLEM — E66.01 MORBID OBESITY WITH BMI OF 40.0-44.9, ADULT: Status: RESOLVED | Noted: 2021-12-29 | Resolved: 2023-03-06

## 2023-03-06 LAB
B-HCG UR QL: NEGATIVE
BILIRUB BLD-MCNC: NEGATIVE MG/DL
CLARITY, POC: CLEAR
COLOR UR: YELLOW
EXPIRATION DATE: NORMAL
GLUCOSE UR STRIP-MCNC: NEGATIVE MG/DL
INTERNAL NEGATIVE CONTROL: NEGATIVE
INTERNAL POSITIVE CONTROL: POSITIVE
KETONES UR QL: NEGATIVE
LEUKOCYTE EST, POC: ABNORMAL
Lab: NORMAL
NITRITE UR-MCNC: NEGATIVE MG/ML
PH UR: 6 [PH] (ref 5–8)
PROT UR STRIP-MCNC: ABNORMAL MG/DL
RBC # UR STRIP: ABNORMAL /UL
SP GR UR: 1.02 (ref 1–1.03)
UROBILINOGEN UR QL: NORMAL

## 2023-03-06 PROCEDURE — 58300 INSERT INTRAUTERINE DEVICE: CPT | Performed by: OBSTETRICS & GYNECOLOGY

## 2023-03-06 PROCEDURE — 81025 URINE PREGNANCY TEST: CPT | Performed by: OBSTETRICS & GYNECOLOGY

## 2023-03-06 PROCEDURE — 99213 OFFICE O/P EST LOW 20 MIN: CPT | Performed by: OBSTETRICS & GYNECOLOGY

## 2023-03-06 RX ORDER — ESCITALOPRAM OXALATE 10 MG/1
TABLET ORAL
COMMUNITY
Start: 2023-01-16

## 2023-03-06 NOTE — PROGRESS NOTES
[facility]   Obstetrics and Gynecology     3/6/2023      Patient:  Maritza Asencio   MR#:[id number]    Office note    Chief Complaint   Patient presents with   • Contraception     mirena insert       Subjective     History of Present Illness  47 y.o. female  presents for IUD insertion and incidentally reports a bulge in her lower abdomen with cough and Valsalva.  The patient has not experienced any pain or discomfort from the bulges just instantly noted it in the last 3 to 4 months.    Contributory history is previous gastric bypass and more than 150 pound weight loss in the last year.    The patient had previous  section in .        Relevant data reviewed:      Patient Active Problem List   Diagnosis   • Essential hypertension   • Dyslipidemia   • Renal duplicate collecting system, right   • Anxiety   • Hyperlipidemia   • Gastric bypass status for obesity       Past Medical History:   Diagnosis Date   • Anemia in pregnancy 3/23/2016   • Anxiety    • Depression    • Hyperlipidemia    • Hypertension    • Migraine    • Pulmonary edema, acute ([facility]) 2016    Postpartum day#10, EKG nl, Echo normal EF, , Sx improved with lasix.    • UTI (urinary tract infection) during pregnancy 10/19/2015    E-coli     Past Surgical History:   Procedure Laterality Date   • ANAL FISSURECTOMY  2018   •  SECTION N/A 2016    Procedure:  SECTION PRIMARY;  Surgeon: Xander Rodgers MD;  Location: [facility] LABOR DELIVERY;  Service:    • CHOLECYSTECTOMY     • GASTRIC BYPASS  2021   • HEMORRHOIDECTOMY  2018     Obstetric History:  OB History        3    Para   1    Term   1            AB   1    Living   1       SAB   1    IAB        Ectopic        Molar        Multiple   0    Live Births   1               Menstrual History:     Patient's last menstrual period was 2023 (within days).       # 1 - Date: 2009, Sex: None, Weight: None, GA: 5w0d,  Delivery: Spontaneous , Apgar1: None, Apgar5: None, Living: Fetal Demise, Birth Comments: None    # 2 - Date: 18, Sex: Female, Weight: 3595 g (7 lb 14.8 oz), GA: 39w1d, Delivery: , Low Transverse, Apgar1: 9, Apgar5: 9, Living: Living, Birth Comments: None    # 3 - Date: None, Sex: None, Weight: None, GA: None, Delivery: None, Apgar1: None, Apgar5: None, Living: None, Birth Comments: None    Family History   Problem Relation Age of Onset   • Heart disease Father    • Diabetes Father    • Heart attack Father          at age 52   • Hyperlipidemia Father    • Hypertension Mother    • Multiple sclerosis Sister    • No Known Problems Daughter    • Breast cancer Neg Hx      Social History     Tobacco Use   • Smoking status: Never   • Smokeless tobacco: Never   Vaping Use   • Vaping Use: Never used   Substance Use Topics   • Alcohol use: No   • Drug use: No     Patient has no known allergies.    Current Outpatient Medications:   •  atenolol (TENORMIN) 25 MG tablet, Take 1 tablet by mouth 2 (two) times a day., Disp: , Rfl:   •  busPIRone (BUSPAR) 30 MG tablet, Take 1 tablet by mouth., Disp: , Rfl:   •  Calcium Carbonate-Vit D-Min (CALCIUM 1200 PO), Take  by mouth., Disp: , Rfl:   •  escitalopram (LEXAPRO) 10 MG tablet, TAKE ONE TABLET BY MOUTH ONE TIME EACH DAY, Disp: , Rfl:   •  prenatal vitamin (prenatal, CLASSIC, vitamin) tablet, Take 1 tablet by mouth Daily., Disp: , Rfl:   •  Levonorgestrel (MIRENA) 20 MCG/DAY intrauterine device IUD, 1 each by Intrauterine route Every 8 (Eight) Years., Disp: , Rfl:     Current Facility-Administered Medications:   •  Levonorgestrel (MIRENA) 20 MCG/DAY IUD intrauterine device 1 each, 1 each, Intrauterine, Once, Xander Rodgers MD    The following portions of the patient's history were reviewed and updated as appropriate: allergies, current medications, past family history, past medical history, past social history, past surgical history and problem  "list.    Review of Systems   Constitutional: Negative.    Respiratory: Negative.    Cardiovascular: Negative.    Gastrointestinal: Negative.    Genitourinary: Negative.    Psychiatric/Behavioral: Negative.        BP Readings from Last 3 Encounters:   03/06/23 118/72   01/11/23 106/68   12/29/21 122/74      Wt Readings from Last 3 Encounters:   03/06/23 76.7 kg (169 lb)   01/11/23 75.9 kg (167 lb 6.4 oz)   12/29/21 114 kg (252 lb)      BMI: Estimated body mass index is 29.01 kg/m² as calculated from the following:    Height as of this encounter: 162.6 cm (64\").    Weight as of this encounter: 76.7 kg (169 lb). BSA: Estimated body surface area is 1.82 meters squared as calculated from the following:    Height as of this encounter: 162.6 cm (64\").    Weight as of this encounter: 76.7 kg (169 lb).    Objective   Physical Exam  Vitals and nursing note reviewed.   Constitutional:       Appearance: She is well-developed.   HENT:      Head: Normocephalic and atraumatic.   Cardiovascular:      Rate and Rhythm: Normal rate.   Pulmonary:      Effort: Pulmonary effort is normal.   Abdominal:      General: Bowel sounds are normal. There is no distension.      Palpations: Abdomen is soft.      Tenderness: There is no abdominal tenderness.       Skin:     General: Skin is warm and dry.   Neurological:      Mental Status: She is alert and oriented to person, place, and time.   Psychiatric:         Behavior: Behavior normal.         Thought Content: Thought content normal.         Judgment: Judgment normal.         Assessment & Plan     Diagnoses and all orders for this visit:    1. Incisional hernia without obstruction or gangrene (Primary)    2. Pre-procedure lab exam  -     POC Pregnancy, Urine    3. Screening for blood or protein in urine  -     POC Urinalysis Dipstick    4. Encounter for IUD insertion  -     Levonorgestrel (MIRENA) 20 MCG/DAY intrauterine device IUD; 1 each by Intrauterine route Every 8 (Eight) Years.  -     " Levonorgestrel (MIRENA) 20 MCG/DAY IUD intrauterine device 1 each          Return in 6 weeks (on 4/17/2023) for Ultrasound for IUD position confirmation.    Xander Rodgers MD   3/6/2023 09:56 EST

## 2023-03-06 NOTE — PROGRESS NOTES
Procedures    IUD Insertion Procedure Note    Indication: Desires long acting reversible contraception     Procedure Details   Urine pregnancy test was done and was NEGATIVE .  The risks (including infection, bleeding, pain, and uterine perforation) and benefits of the procedure were explained to the patient and Written informed consent was obtained.      Cervix cleansed with Betadine. Uterus sounded to 9 cm. IUD inserted without difficulty. String visible and trimmed.    IUD Information:  Mirena, Lot # [id number], Expiration date 03/2025.    Condition:  Stable    Complications:  None    Patient tolerated the procedure well without complications.    Plan:    The patient was advised to call for any fever or for prolonged or severe pain or bleeding. She was advised to use NSAID as needed for mild to moderate pain.     Attending Physician Documentation:  I was present for the entire procedure.    Xander Rodgers MD  3/6/2023  09:43 EST

## 2023-03-11 LAB
A VAGINAE DNA VAG QL NAA+PROBE: NORMAL SCORE
BVAB2 DNA VAG QL NAA+PROBE: NORMAL SCORE
C ALBICANS DNA VAG QL NAA+PROBE: NEGATIVE
C GLABRATA DNA VAG QL NAA+PROBE: NEGATIVE
C TRACH DNA VAG QL NAA+PROBE: NEGATIVE
MEGA1 DNA VAG QL NAA+PROBE: NORMAL SCORE
N GONORRHOEA DNA VAG QL NAA+PROBE: NEGATIVE
T VAGINALIS DNA VAG QL NAA+PROBE: NEGATIVE

## 2023-03-21 ENCOUNTER — OFFICE VISIT (OUTPATIENT)
Dept: SURGERY | Facility: CLINIC | Age: 48
End: 2023-03-21
Payer: COMMERCIAL

## 2023-03-21 VITALS — WEIGHT: 175.4 LBS | BODY MASS INDEX: 29.94 KG/M2 | HEIGHT: 64 IN

## 2023-03-21 DIAGNOSIS — L98.7 REDUNDANT SKIN OF ABDOMEN: ICD-10-CM

## 2023-03-21 DIAGNOSIS — K43.2 INCISIONAL HERNIA, WITHOUT OBSTRUCTION OR GANGRENE: Primary | ICD-10-CM

## 2023-03-21 PROCEDURE — 1160F RVW MEDS BY RX/DR IN RCRD: CPT | Performed by: SURGERY

## 2023-03-21 PROCEDURE — 99203 OFFICE O/P NEW LOW 30 MIN: CPT | Performed by: SURGERY

## 2023-03-21 PROCEDURE — 1159F MED LIST DOCD IN RCRD: CPT | Performed by: SURGERY

## 2023-03-21 NOTE — PROGRESS NOTES
Subjective   Maritza Asencio is a 47 y.o. female who presents to the office in surgical consultation from Mira Zurita MD and Xander Rodgers MD for an incisional hernia.    History of Present Illness     The patient had a  in  at which time she weighed over 300 pounds.  She recovered well from that surgery with no problems.  She then had a gastric bypass about a year ago and lost over 100 pounds.  With that weight loss, she has noticed a bulge in her lower abdomen in the region of the  incision.  She is not sure how long the bulge has been present because it was obscured by the excess weight.  She now notices it most when she is lying supine and coughs.  She has not noticed that when she stands but she has never felt her lower abdominal wall while standing.    She does not have pain in the area of the bulging.  She has not had any change in her bowel or bladder function.    She had a large amount of weight loss with her gastric bypass and now has redundant skin along her lower abdominal wall as well as her thighs.  She has voiced interest in a plastic surgeon seeing her to excise the redundant skin in the form of an abdominoplasty.    Review of Systems   Constitutional: Negative for fatigue and fever.   Respiratory: Negative for chest tightness and shortness of breath.    Cardiovascular: Negative for chest pain and palpitations.   Gastrointestinal: Negative for abdominal pain, blood in stool, constipation, diarrhea, nausea and vomiting.     Past Medical History:   Diagnosis Date   • Anemia in pregnancy 3/23/2016   • Anxiety    • Depression    • Hyperlipidemia    • Hypertension    • Migraine    • Pulmonary edema, acute (HCC) 2016    Postpartum day#10, EKG nl, Echo normal EF, , Sx improved with lasix.    • UTI (urinary tract infection) during pregnancy 10/19/2015    E-coli     Past Surgical History:   Procedure Laterality Date   • ANAL FISSURECTOMY  2018   •  SECTION  N/A 2016    Procedure:  SECTION PRIMARY;  Surgeon: Xander Rodgers MD;  Location: [facility] LABOR DELIVERY;  Service:    • CHOLECYSTECTOMY     • GASTRIC BYPASS  2021   • HEMORRHOIDECTOMY  2018     Family History   Problem Relation Age of Onset   • Heart disease Father    • Diabetes Father    • Heart attack Father          at age 52   • Hyperlipidemia Father    • Hypertension Mother    • Multiple sclerosis Sister    • No Known Problems Daughter    • Breast cancer Neg Hx      Social History     Socioeconomic History   • Marital status:    • Number of children: 1   Tobacco Use   • Smoking status: Never   • Smokeless tobacco: Never   Vaping Use   • Vaping Use: Never used   Substance and Sexual Activity   • Alcohol use: No   • Drug use: No   • Sexual activity: Yes     Partners: Male     Birth control/protection: OCP     Comment: spouse = CJ       Objective   Physical Exam  Constitutional:       Appearance: Normal appearance. She is well-developed. She is not toxic-appearing.   Eyes:      General: No scleral icterus.  Pulmonary:      Effort: Pulmonary effort is normal. No respiratory distress.   Abdominal:      Palpations: Abdomen is soft.      Tenderness: There is no abdominal tenderness.      Hernia: A hernia is present. Hernia is present in the ventral area.      Comments: There is a large incisional hernia involving her  scar in the lower midline and to the right of midline.  It clearly contains bowel but is reducible.  It is also easily palpable when standing.   Skin:     General: Skin is warm and dry.   Neurological:      Mental Status: She is alert and oriented to person, place, and time.   Psychiatric:         Behavior: Behavior normal.         Thought Content: Thought content normal.         Judgment: Judgment normal.         Assessment & Plan     1.  Incisional hernia: The patient has an incisional hernia involving her  incision that contains bowel.  Based  on its location in the lower abdomen it is likely to get larger, especially while she is on her feet.  This was discussed with her.  She was offered an incisional hernia repair.  She states that she needs to wait at least several months before performing an incisional hernia repair and she was advised to use an abdominal binder to provide additional support to prevent the hernia from getting larger.  She will contact our office when she is ready to schedule surgery.    2.  Redundant abdominal wall: The patient had a gastric bypass and has excess skin in her lower abdominal wall that is very redundant.  This redundant skin is at the site of the hernia.  Since she has some interest in an abdominoplasty, it may be beneficial to meet with a plastic surgeon to determine if an abdominoplasty can be performed at the same time as the incisional hernia repair.  I will refer her to a plastic surgeon.

## 2023-04-11 NOTE — PROGRESS NOTES
"Chief Complaint  Consult (Consult panniculectomy)    Subjective               HPI   Maritza Asencio is a 47 y.o. female who presents to [facility] as a consult  for   Chief Complaint   Patient presents with   • Consult     Consult panniculectomy   .   Patient has had gastric bypass approximately 18 months ago. Starting weight is 310 lbs, current weight is 176 lbs, goal weight 185 lbs. patient has lost over 130 pounds.  Patient weight has been stable at current weight for 9 months. Patient HAS rash and moisture under abdominal fold. Treats rash with Nystatin powder and cream History of surgeries: 5 Patient has 1 children, IS done having children. Patient HAS issues with activities of daily living.  The patient states that the excess lower abdominal skin makes it difficult for her to bend down, tie her shoes, etc. because of the volume of tissue.  She has difficulty with hygiene as well, keeping the area dry and such.  She has difficulty exercising because of the heavy and pendulous nature of her lower abdominal pannus.  She has tried compression garments with no relief.    Pt also has hernia and would like to discuss coordinating the repair with the general surgeon.          Allergies: Patient has no known allergies.  Allergies Reconciled.    Review of Systems  All system were reviewed and were negative, except the ones noted above.     Objective     /73 (BP Location: Left arm, Patient Position: Sitting, Cuff Size: Adult)   Pulse 51   Temp 98.6 °F (37 °C) (Temporal)   Ht 162.6 cm (64.02\")   Wt 79.8 kg (176 lb)   SpO2 99%   BMI 30.20 kg/m²     Body mass index is 30.2 kg/m².    Physical Exam   • Patient is not tachycardic   • Respiration is non elaborated.   • Abdomen  • Excess skin above and below umbilicus with grade 4 panniculus. Significant moisture, hyperpigmentation, and irritation over lower abdominal fold, has palpable hernia, and no open areas. " Lipodystrophy of abdomen.  She has a moderate mid abdominal skin excess horizontally as well.  She has stretch marks that extend onto the upper abdomen.  Patient's hernia is easily palpable just to the right of midline in the lower abdomen.  Difficult to precisely discern the fascial edges, but the size of the hernia sac is at least 10 to 12 cm in diameter.  The fascial edges appear to be smaller than that in diameter.  The hernia is reducible.  • There is tethering of the umbilicus with hyperpigmentation adjacent to the umbilicus consistent with a chronic irritation.  • The lower abdominal pannus hangs over the suprapubic crease over 12 cm.  •     Result Review :       Procedures              Diagnoses and all orders for this visit:    1. Panniculitis (Primary)    2. Lipodystrophy        Plan:  Panniculectomy     - I had a long discussion with the patient regarding their panniculitis. I have explained that I believe they would benefit from panniculectomy to address excess hanging skin. I explained that this would involve a low transverse incision and would only address the excess skin below the umbilicus. Patient verbalizes understands that this is not a cosmetic body contouring procedure.  Scar position was discussed, including long transverse incision. We discussed recovery time which would include no heavy lifting for 4-6 weeks, drains, and abdominal binder. We discussed risks including but not limited to bleeding, infection, pain, wound healing problems, and blood clot.    The vertical component was discussed with the patient as well.  She is concerned about the increased risk for wound complications and fluid collection.  She will think about it some more but she is leaning away from having the vertical component performed as a part of the panniculectomy.  This procedure can be coordinated and performed concurrently with the general surgeons.  The general surgeon would perform the hernia repair (laparoscopic  versus open) and then the panniculectomy would continue.  This would not significantly increased infection rates or complication rates and would greatly help the patient with respect to her recovery.  She would not require 2 separate recoveries.  Another possibility would be to perform the hernia repair first and then about a week later perform the panniculectomy.  This will be discussed with her general surgeon in [place].    Discussed procedure and scaring with pt.  Discussed will be cut hip to hip.  Discussed Hernia repair can be done couple days before paniculectomy.(will reach out to  to clarify)  Discussed will lose bellybutton time of surgery, will make 3-4 months after surgery.  Will tighten mons area.  Discussed restrictions and down time.  Photos done in clinic today.           CPT code 70529 panniculectomy    Scribed by Georgie Ibarra, acting as a scribe for David Umanzor MD, 04/17/23 13:11 EDT.  David Umanzor MD's signature on the note affirms that the note adequately documents the care provided.      Follow Up     Return RTC once surgery is schd.    Patient was given instructions and counseling regarding her condition. Please see specific information pulled into the AVS if appropriate.     David Umanzor MD  04/17/2023

## 2023-04-17 ENCOUNTER — PREP FOR SURGERY (OUTPATIENT)
Dept: OTHER | Facility: HOSPITAL | Age: 48
End: 2023-04-17
Payer: COMMERCIAL

## 2023-04-17 ENCOUNTER — OFFICE VISIT (OUTPATIENT)
Dept: PLASTIC SURGERY | Facility: CLINIC | Age: 48
End: 2023-04-17
Payer: COMMERCIAL

## 2023-04-17 VITALS
WEIGHT: 176 LBS | HEIGHT: 64 IN | OXYGEN SATURATION: 99 % | TEMPERATURE: 98.6 F | DIASTOLIC BLOOD PRESSURE: 73 MMHG | HEART RATE: 51 BPM | SYSTOLIC BLOOD PRESSURE: 115 MMHG | BODY MASS INDEX: 30.05 KG/M2

## 2023-04-17 DIAGNOSIS — M79.3 PANNICULITIS: Primary | ICD-10-CM

## 2023-04-17 DIAGNOSIS — E88.1 LIPODYSTROPHY: ICD-10-CM

## 2023-04-17 PROCEDURE — 3078F DIAST BP <80 MM HG: CPT | Performed by: SURGERY

## 2023-04-17 PROCEDURE — 1159F MED LIST DOCD IN RCRD: CPT | Performed by: SURGERY

## 2023-04-17 PROCEDURE — 1160F RVW MEDS BY RX/DR IN RCRD: CPT | Performed by: SURGERY

## 2023-04-17 PROCEDURE — 99204 OFFICE O/P NEW MOD 45 MIN: CPT | Performed by: SURGERY

## 2023-04-17 PROCEDURE — 3074F SYST BP LT 130 MM HG: CPT | Performed by: SURGERY

## 2023-04-17 RX ORDER — OMEPRAZOLE 40 MG/1
CAPSULE, DELAYED RELEASE ORAL
COMMUNITY
Start: 2023-04-04

## 2023-04-17 RX ORDER — CEFAZOLIN SODIUM 2 G/100ML
2 INJECTION, SOLUTION INTRAVENOUS ONCE
OUTPATIENT
Start: 2023-04-17 | End: 2023-04-17

## 2023-04-24 ENCOUNTER — TELEPHONE (OUTPATIENT)
Dept: SURGERY | Facility: CLINIC | Age: 48
End: 2023-04-24
Payer: COMMERCIAL

## 2023-04-24 NOTE — TELEPHONE ENCOUNTER
I spoke with Dr. Umanzor regarding this patient's surgical needs.  We both agree that she will need an incisional hernia repair with component separation and mesh as well as a panniculectomy.    He is in [place] and does not operate in [place] and I do not operate in [place].  The options are for him to refer to a general surgeon in [place] and perform a combined procedure or for me to refer to a local plastic surgeon and do a combined procedure in [place].    This was all discussed with the patient on the telephone.  She will consider her options and let me know which facility she wants to use based on her convenience and support system.

## 2023-04-26 ENCOUNTER — OFFICE VISIT (OUTPATIENT)
Dept: OBSTETRICS AND GYNECOLOGY | Age: 48
End: 2023-04-26
Payer: COMMERCIAL

## 2023-04-26 VITALS
BODY MASS INDEX: 29.53 KG/M2 | DIASTOLIC BLOOD PRESSURE: 70 MMHG | SYSTOLIC BLOOD PRESSURE: 128 MMHG | HEIGHT: 64 IN | WEIGHT: 173 LBS

## 2023-04-26 DIAGNOSIS — I10 ESSENTIAL HYPERTENSION: ICD-10-CM

## 2023-04-26 DIAGNOSIS — Z13.89 SCREENING FOR BLOOD OR PROTEIN IN URINE: ICD-10-CM

## 2023-04-26 DIAGNOSIS — N83.201 RIGHT OVARIAN CYST: ICD-10-CM

## 2023-04-26 DIAGNOSIS — Z97.5 INTERMENSTRUAL SPOTTING DUE TO INTRAUTERINE DEVICE (IUD): Primary | ICD-10-CM

## 2023-04-26 DIAGNOSIS — N92.0 INTERMENSTRUAL SPOTTING DUE TO INTRAUTERINE DEVICE (IUD): Primary | ICD-10-CM

## 2023-04-26 DIAGNOSIS — Z97.5 IUD (INTRAUTERINE DEVICE) IN PLACE: ICD-10-CM

## 2023-04-26 LAB
BILIRUB BLD-MCNC: NEGATIVE MG/DL
GLUCOSE UR STRIP-MCNC: NEGATIVE MG/DL
KETONES UR QL: NEGATIVE
LEUKOCYTE EST, POC: ABNORMAL
NITRITE UR-MCNC: NEGATIVE MG/ML
PH UR: 5.5 [PH] (ref 5–8)
PROT UR STRIP-MCNC: ABNORMAL MG/DL
RBC # UR STRIP: ABNORMAL /UL
SP GR UR: 1.02 (ref 1–1.03)
UROBILINOGEN UR QL: NORMAL

## 2023-04-26 NOTE — PROGRESS NOTES
[facility]   Obstetrics and Gynecology     2023      Patient:  Maritza Asencio   MR#:[id number]    Office note    Chief Complaint   Patient presents with   • Gynecologic Exam     CC: IUD Check, last pap 23 neg       Subjective     History of Present Illness  47 y.o. female  presents for follow-up after having had IUD placement reporting minimal symptoms with continued periodic spotting.  Ultrasound today shows IUD correctly placed in the uterus with a small simple right ovarian cyst.        Relevant data reviewed:  US Non-ob Transvaginal (2023 08:06)      Patient Active Problem List   Diagnosis   • Essential hypertension   • Dyslipidemia   • Renal duplicate collecting system, right   • Anxiety   • Hyperlipidemia   • Gastric bypass status for obesity   • IUD (intrauterine device) in place   • Right ovarian cyst       Past Medical History:   Diagnosis Date   • Anemia in pregnancy 3/23/2016   • Anxiety    • Depression    • Hyperlipidemia    • Hypertension    • Migraine    • Pulmonary edema, acute 2016    Postpartum day#10, EKG nl, Echo normal EF, , Sx improved with lasix.    • Right ovarian cyst 2023   • UTI (urinary tract infection) during pregnancy 10/19/2015    E-coli     Past Surgical History:   Procedure Laterality Date   • ANAL FISSURECTOMY  2018   •  SECTION N/A 2016    Procedure:  SECTION PRIMARY;  Surgeon: Xander Rodgers MD;  Location: [facility] LABOR DELIVERY;  Service:    • CHOLECYSTECTOMY     • GASTRIC BYPASS  2021   • HEMORRHOIDECTOMY  2018     Obstetric History:  OB History        2    Para   1    Term   1            AB   1    Living   1       SAB   1    IAB        Ectopic        Molar        Multiple   0    Live Births   1               Menstrual History:     No LMP recorded.       # 1 - Date: 2009, Sex: None, Weight: None, GA: 5w0d, Delivery: Spontaneous , Apgar1: None, Apgar5: None,  Living: Fetal Demise, Birth Comments: None    # 2 - Date: 16, Sex: Female, Weight: 3595 g (7 lb 14.8 oz), GA: 39w1d, Delivery: , Low Transverse, Apgar1: 9, Apgar5: 9, Living: Living, Birth Comments: None    Family History   Problem Relation Age of Onset   • Heart disease Father    • Diabetes Father    • Heart attack Father          at age 52   • Hyperlipidemia Father    • Hypertension Mother    • Multiple sclerosis Sister    • No Known Problems Daughter    • Breast cancer Neg Hx    • Ovarian cancer Neg Hx    • Uterine cancer Neg Hx    • Colon cancer Neg Hx      Social History     Tobacco Use   • Smoking status: Never     Passive exposure: Never   • Smokeless tobacco: Never   Vaping Use   • Vaping Use: Never used   Substance Use Topics   • Alcohol use: No   • Drug use: No     Patient has no known allergies.    Current Outpatient Medications:   •  atenolol (TENORMIN) 25 MG tablet, Take 1 tablet by mouth 2 (two) times a day., Disp: , Rfl:   •  busPIRone (BUSPAR) 30 MG tablet, Take 1 tablet by mouth., Disp: , Rfl:   •  Calcium Carbonate-Vit D-Min (CALCIUM 1200 PO), Take  by mouth., Disp: , Rfl:   •  escitalopram (LEXAPRO) 10 MG tablet, TAKE ONE TABLET BY MOUTH ONE TIME EACH DAY, Disp: , Rfl:   •  Levonorgestrel (MIRENA) 20 MCG/DAY intrauterine device IUD, 1 each by Intrauterine route Every 8 (Eight) Years., Disp: , Rfl:   •  omeprazole (priLOSEC) 40 MG capsule, Take 1 capsule (40 mg total) by mouth 1 (one) time each day before breakfast. Do not crush or chew., Disp: , Rfl:   •  prenatal vitamin (prenatal, CLASSIC, vitamin) tablet, Take 1 tablet by mouth Daily., Disp: , Rfl:     The following portions of the patient's history were reviewed and updated as appropriate: allergies, current medications, past family history, past medical history, past social history, past surgical history and problem list.    Review of Systems   Constitutional: Negative.    Respiratory: Negative.    Cardiovascular: Negative.  "   Gastrointestinal: Negative.    Genitourinary: Positive for menstrual problem.   Psychiatric/Behavioral: Negative.        BP Readings from Last 3 Encounters:   04/26/23 128/70   04/17/23 115/73   03/06/23 118/72      Wt Readings from Last 3 Encounters:   04/26/23 78.5 kg (173 lb)   04/17/23 79.8 kg (176 lb)   03/21/23 79.6 kg (175 lb 6.4 oz)      BMI: Estimated body mass index is 29.7 kg/m² as calculated from the following:    Height as of this encounter: 162.6 cm (64\").    Weight as of this encounter: 78.5 kg (173 lb). BSA: Estimated body surface area is 1.84 meters squared as calculated from the following:    Height as of this encounter: 162.6 cm (64\").    Weight as of this encounter: 78.5 kg (173 lb).    Objective   Physical Exam  Vitals and nursing note reviewed.   Constitutional:       Appearance: She is well-developed.   HENT:      Head: Normocephalic and atraumatic.   Cardiovascular:      Rate and Rhythm: Normal rate.   Pulmonary:      Effort: Pulmonary effort is normal.   Abdominal:      General: Bowel sounds are normal. There is no distension.      Palpations: Abdomen is soft.      Tenderness: There is no abdominal tenderness.   Skin:     General: Skin is warm and dry.   Neurological:      Mental Status: She is alert and oriented to person, place, and time.   Psychiatric:         Behavior: Behavior normal.         Thought Content: Thought content normal.         Judgment: Judgment normal.         Assessment & Plan     Diagnoses and all orders for this visit:    1. Intermenstrual spotting due to intrauterine device (IUD) (Primary)  Comments:  Ultrasound shows device correctly seated in the uterine cavity    2. Screening for blood or protein in urine  -     POC Urinalysis Dipstick    3. Essential hypertension    4. IUD (intrauterine device) in place    5. Right ovarian cyst          Return in about 3 months (around 7/26/2023) for Recheck right ovarian cyst.    Xander Rodgers MD   4/26/2023 08:20 EDT  "

## 2023-05-08 ENCOUNTER — PREP FOR SURGERY (OUTPATIENT)
Dept: OTHER | Facility: HOSPITAL | Age: 48
End: 2023-05-08
Payer: COMMERCIAL

## 2023-05-09 DIAGNOSIS — M79.3 PANNICULITIS: Primary | ICD-10-CM

## 2023-05-22 ENCOUNTER — OFFICE VISIT (OUTPATIENT)
Dept: SURGERY | Facility: CLINIC | Age: 48
End: 2023-05-22
Payer: COMMERCIAL

## 2023-05-22 VITALS — WEIGHT: 177 LBS | HEIGHT: 64 IN | BODY MASS INDEX: 30.22 KG/M2

## 2023-05-22 DIAGNOSIS — K43.2 INCISIONAL HERNIA, WITHOUT OBSTRUCTION OR GANGRENE: Primary | ICD-10-CM

## 2023-05-22 NOTE — PROGRESS NOTES
Chief Complaint:  Hernia (Incisional )    Primary Care Provider: Mira Zurita MD    Referring Provider: David Umanzor*    History of Present Illness  Maritza Asencio is a 47 y.o. female referred by David Umanzor* for incisional hernia repair.  Patient had gastric bypass about 18 to 20 months ago.  She originally weighed about 310 pounds and now is in the 170s.  Patient has a pannus and has rash and moisture under the abdominal folds.  Decision was made that she would benefit from panniculectomy.  Dr. Umanzor would like to coordinate hernia repair with the panniculectomy if at all possible.  Patient has had  and she is also had laparoscopic gallbladder removal.  She does not smoke cigarettes.  The patient works as a  at "[employer].  No abdominal wall imaging.    Allergies: Patient has no known allergies.    Outpatient Medications Marked as Taking for the 23 encounter (Office Visit) with Jose Ramon Whalen MD   Medication Sig Dispense Refill   • atenolol (TENORMIN) 25 MG tablet Take 1 tablet by mouth 2 (two) times a day.     • baclofen (LIORESAL) 10 MG tablet Take 1 tablet by mouth Every 12 (Twelve) Hours.     • busPIRone (BUSPAR) 30 MG tablet Take 1 tablet by mouth.     • Calcium Carbonate-Vit D-Min (CALCIUM 1200 PO) Take  by mouth.     • escitalopram (LEXAPRO) 10 MG tablet TAKE ONE TABLET BY MOUTH ONE TIME EACH DAY     • Levonorgestrel (MIRENA) 20 MCG/DAY intrauterine device IUD 1 each by Intrauterine route Every 8 (Eight) Years.     • omeprazole (priLOSEC) 40 MG capsule Take 1 capsule (40 mg total) by mouth 1 (one) time each day before breakfast. Do not crush or chew.     • prenatal vitamin (prenatal, CLASSIC, vitamin) tablet Take 1 tablet by mouth Daily.         Past Medical History:   • Anemia in pregnancy   • Anxiety   • Depression   • Hyperlipidemia   • Hypertension   • Migraine   • Pulmonary edema, acute    Postpartum day#10, EKG nl, Echo normal EF, , Sx  "improved with lasix.    • Right ovarian cyst   • UTI (urinary tract infection) during pregnancy    E-coli        Past Surgical History:   • ANAL FISSURECTOMY   •  SECTION    Procedure:  SECTION PRIMARY;  Surgeon: Xander Rodgers MD;  Location: [facility] LABOR DELIVERY;  Service:    • CHOLECYSTECTOMY   • GASTRIC BYPASS   • HEMORRHOIDECTOMY       Family History:   Family History   Problem Relation Age of Onset   • Heart disease Father    • Diabetes Father    • Heart attack Father          at age 52   • Hyperlipidemia Father    • Hypertension Mother    • Multiple sclerosis Sister    • No Known Problems Daughter    • Breast cancer Neg Hx    • Ovarian cancer Neg Hx    • Uterine cancer Neg Hx    • Colon cancer Neg Hx         Social History:  Social History     Tobacco Use   • Smoking status: Never     Passive exposure: Never   • Smokeless tobacco: Never   Substance Use Topics   • Alcohol use: No       Objective     Vital Signs:  Ht 162.6 cm (64\")   Wt 80.3 kg (177 lb)   BMI 30.38 kg/m²   • Constitutional: here alone, alert, no acute distress, reliable historian  • HENT:  NCAT, no visible deformities or lesions  • Eyes:  sclerae clear, conjunctivae clear, EOMI  • Neck:  normal appearance, no masses, trachea midline  • Respiratory:  breathing not labored, respiratory effort appears normal  • Cardiovascular:  heart regular rate  • Abdomen:  soft, nontender, nondistended, redundant adipose tissue at lower abdomen.  Moderate to large size bulge at the midline of lower abdomen.  Bulge is reducible.  • Skin and subcutaneous tissue:  Warm and dry  • Musculoskeletal: moving all extremities symmetrically and purposefully  • Neurologic:  no obvious motor or sensory deficits, normal gait, able to stand without difficulty, cerebellar function without any obvious abnormalities, alert & oriented x 3, speech clear  • Psychiatric:  judgment and insight intact, mood normal, affect appropriate, " cooperative      Assessment:  Incisional hernia      Plan:  CT abdomen pelvis without contrast to survey abdominal wall for number of fascial defects and sizes of fascial defects.  I will call the patient on the telephone once I have the CT scan result.  We talked about the option of robotic abdominal wall hernia repair.  I told her  prefer not to do hernia repair at the same time as the panniculectomy as I want to minimize any chance for infection of the abdominal wall that could impact the mesh.      Jose Ramon Whalen MD  05/22/2023    Electronically signed by Jose Ramon Whalen MD, 05/22/23, 7:24 AM EDT.

## 2023-06-02 ENCOUNTER — HOSPITAL ENCOUNTER (OUTPATIENT)
Dept: CT IMAGING | Facility: HOSPITAL | Age: 48
Discharge: HOME | End: 2023-06-02

## 2023-06-02 DIAGNOSIS — K43.2 INCISIONAL HERNIA, WITHOUT OBSTRUCTION OR GANGRENE: ICD-10-CM

## 2023-06-02 PROCEDURE — 74176 CT ABD & PELVIS W/O CONTRAST: CPT

## 2023-06-14 ENCOUNTER — TELEPHONE (OUTPATIENT)
Dept: SURGERY | Facility: CLINIC | Age: 48
End: 2023-06-14
Payer: COMMERCIAL

## 2023-06-27 PROBLEM — K43.2 VENTRAL INCISIONAL HERNIA: Status: ACTIVE | Noted: 2023-06-27

## 2023-07-07 PROBLEM — K43.2 VENTRAL INCISIONAL HERNIA: Status: RESOLVED | Noted: 2023-06-27 | Resolved: 2023-07-07

## 2023-08-14 ENCOUNTER — TELEPHONE (OUTPATIENT)
Dept: SURGERY | Facility: CLINIC | Age: 48
End: 2023-08-14
Payer: COMMERCIAL

## 2023-08-14 NOTE — TELEPHONE ENCOUNTER
PT CALLED BACK AND STATED THAT EVERYTHING WAS GOOD WITH HER INCISION AND SHE DIDN'T NEED TO RS HER APPT, ANYTHING ELSE TO DO?

## 2023-10-23 ENCOUNTER — OFFICE VISIT (OUTPATIENT)
Dept: OBSTETRICS AND GYNECOLOGY | Age: 48
End: 2023-10-23
Payer: COMMERCIAL

## 2023-10-23 VITALS
BODY MASS INDEX: 32.65 KG/M2 | HEIGHT: 65 IN | DIASTOLIC BLOOD PRESSURE: 68 MMHG | SYSTOLIC BLOOD PRESSURE: 118 MMHG | WEIGHT: 196 LBS

## 2023-10-23 DIAGNOSIS — I10 ESSENTIAL HYPERTENSION: ICD-10-CM

## 2023-10-23 DIAGNOSIS — N83.201 RIGHT OVARIAN CYST: Primary | ICD-10-CM

## 2023-10-23 DIAGNOSIS — Z13.89 SCREENING FOR BLOOD OR PROTEIN IN URINE: ICD-10-CM

## 2023-10-23 LAB
BILIRUB BLD-MCNC: NEGATIVE MG/DL
CLARITY, POC: CLEAR
COLOR UR: YELLOW
GLUCOSE UR STRIP-MCNC: NEGATIVE MG/DL
KETONES UR QL: NEGATIVE
LEUKOCYTE EST, POC: ABNORMAL
NITRITE UR-MCNC: NEGATIVE MG/ML
PH UR: 6 [PH] (ref 5–8)
PROT UR STRIP-MCNC: ABNORMAL MG/DL
RBC # UR STRIP: ABNORMAL /UL
SP GR UR: 1.02 (ref 1–1.03)
UROBILINOGEN UR QL: ABNORMAL

## 2023-10-23 NOTE — PROGRESS NOTES
[facility]   Obstetrics and Gynecology     10/23/2023      Patient:  Maritza Asencio   MR#:[id number]    Office note    Chief Complaint   Patient presents with    Follow-up     CC: U/S follow up, had U/S today. CC: Rt ovarian cyst, last pap 2023, last mammo 23. No complaints today.        Subjective     History of Present Illness  48 y.o. female  presents for routine follow-up on a right ovarian cyst imaged in April showing maximum dimension approximately 3 cm.  There is 2 separate follicles now present measuring 8 cm in greatest dimension with ovarian volume being approximately 90 cc.  The patient reports feeling well with no symptoms related to the cyst.    We discussed options for management including conservative versus aggressive.  Patient favors conservative management with repeat imaging in 3 months.  We discussed the option to have laparoscopic oophorectomy or cyst resection but with the area being completely asymptomatic she is disinclined        Relevant data reviewed:  US Non-ob Transvaginal (2023 08:06)     Patient Active Problem List   Diagnosis    Essential hypertension    Dyslipidemia    Renal duplicate collecting system, right    Anxiety    Hyperlipidemia    Gastric bypass status for obesity    IUD (intrauterine device) in place    Right ovarian cyst       Past Medical History:   Diagnosis Date    Anemia in pregnancy 2016    NO ISSUES CURRENTLY    Anxiety     Depression     Hyperlipidemia     Hypertension     Migraine     PONV (postoperative nausea and vomiting)     Pulmonary edema, acute 2016    Postpartum day#10, EKG nl, Echo normal EF, , Sx improved with lasix.  NO ISSUES SINCE    Right ovarian cyst 2023    UTI (urinary tract infection) during pregnancy 10/19/2015    E-coli. NO ISSUES CURRENTLY     Past Surgical History:   Procedure Laterality Date    ANAL FISSURECTOMY  2018     SECTION N/A 2016    Procedure:   SECTION PRIMARY;  Surgeon: Xander Rodgers MD;  Location:  [facility];  Service:     CHOLECYSTECTOMY      GASTRIC BYPASS  2021    HEMORRHOIDECTOMY  2018    VENTRAL HERNIA REPAIR N/A 2023    Procedure: Robotic ventral incisional hernia repair;  Surgeon: Jose Ramon Whalen MD;  [place]  [facility] [place] [facility] [place]  Service: Robotics - DaVinci;  Laterality: N/A;     Obstetric History:  OB History          2    Para   1    Term   1            AB   1    Living   1         SAB   1    IAB        Ectopic        Molar        Multiple   0    Live Births   1               Menstrual History:     Patient's last menstrual period was 10/01/2023 (approximate).       # 1 - Date: 2009, Sex: None, Weight: None, GA: 5w0d, Delivery: Spontaneous , Apgar1: None, Apgar5: None, Living: Fetal Demise, Birth Comments: None    # 2 - Date: 16, Sex: Female, Weight: 3595 g (7 lb 14.8 oz), GA: 39w1d, Delivery: , Low Transverse, Apgar1: 9, Apgar5: 9, Living: Living, Birth Comments: None    Family History   Problem Relation Age of Onset    Hypertension Mother     Heart disease Father     Diabetes Father     Heart attack Father          at age 52    Hyperlipidemia Father     Multiple sclerosis Sister     No Known Problems Daughter     Breast cancer Neg Hx     Ovarian cancer Neg Hx     Uterine cancer Neg Hx     Colon cancer Neg Hx     Malig Hyperthermia Neg Hx      Social History     Tobacco Use    Smoking status: Never     Passive exposure: Never    Smokeless tobacco: Never   Vaping Use    Vaping Use: Never used   Substance Use Topics    Alcohol use: Never    Drug use: Never     Patient has no known allergies.    Current Outpatient Medications:     atenolol (TENORMIN) 25 MG tablet, Take 1 tablet by mouth 2 (two) times a day., Disp: , Rfl:     baclofen (LIORESAL) 10 MG tablet, Take 1 tablet by mouth 2 (Two) Times a Day As Needed., Disp: , Rfl:     Calcium Carbonate-Vit D-Min (CALCIUM 1200 PO),  "Take  by mouth., Disp: , Rfl:     escitalopram (LEXAPRO) 10 MG tablet, TAKE ONE TABLET BY MOUTH ONE TIME EACH DAY, Disp: , Rfl:     Levonorgestrel (MIRENA) 20 MCG/DAY intrauterine device IUD, 1 each by Intrauterine route Every 8 (Eight) Years., Disp: , Rfl:     omeprazole (priLOSEC) 40 MG capsule, Take 1 capsule (40 mg total) by mouth 1 (one) time each day before breakfast. Do not crush or chew., Disp: , Rfl:     prenatal vitamin (prenatal, CLASSIC, vitamin) tablet, Take 1 tablet by mouth Daily., Disp: , Rfl:     The following portions of the patient's history were reviewed and updated as appropriate: allergies, current medications, past family history, past medical history, past social history, past surgical history, and problem list.    Review of Systems   Constitutional: Negative.    Respiratory: Negative.     Cardiovascular: Negative.    Gastrointestinal: Negative.    Genitourinary: Negative.    Psychiatric/Behavioral: Negative.         BP Readings from Last 3 Encounters:   10/23/23 118/68   07/07/23 113/59   05/06/23 128/68      Wt Readings from Last 3 Encounters:   10/23/23 88.9 kg (196 lb)   07/07/23 83.5 kg (184 lb 1.4 oz)   05/22/23 80.3 kg (177 lb)      BMI: Estimated body mass index is 33.12 kg/m² as calculated from the following:    Height as of this encounter: 163.8 cm (64.5\").    Weight as of this encounter: 88.9 kg (196 lb). BSA: Estimated body surface area is 1.95 meters squared as calculated from the following:    Height as of this encounter: 163.8 cm (64.5\").    Weight as of this encounter: 88.9 kg (196 lb).    Objective   Physical Exam  Vitals and nursing note reviewed.   Constitutional:       Appearance: Normal appearance. She is well-developed.   HENT:      Head: Normocephalic and atraumatic.      Nose: Nose normal.   Eyes:      Pupils: Pupils are equal, round, and reactive to light.   Neck:      Thyroid: No thyromegaly.      Trachea: No tracheal deviation.   Cardiovascular:      Rate and " Rhythm: Normal rate and regular rhythm.      Heart sounds: Normal heart sounds.   Pulmonary:      Effort: Pulmonary effort is normal.      Breath sounds: Normal breath sounds.   Chest:      Chest wall: No mass.   Breasts:     Right: No mass, nipple discharge or skin change.      Left: No mass, nipple discharge or skin change.   Abdominal:      General: Bowel sounds are normal.      Palpations: Abdomen is soft. There is no mass.      Tenderness: There is no abdominal tenderness. There is no rebound.      Hernia: No hernia is present. There is no hernia in the left inguinal area.   Genitourinary:     Labia:         Right: No rash or lesion.         Left: No rash or lesion.       Vagina: Normal.   Musculoskeletal:         General: Normal range of motion.      Cervical back: Normal range of motion and neck supple.   Skin:     General: Skin is warm.      Findings: No rash.   Neurological:      Mental Status: She is alert and oriented to person, place, and time.      Deep Tendon Reflexes: Reflexes are normal and symmetric.   Psychiatric:         Behavior: Behavior normal.         Thought Content: Thought content normal.         Judgment: Judgment normal.         Assessment & Plan     Diagnoses and all orders for this visit:    1. Right ovarian cyst (Primary)    2. Screening for blood or protein in urine  -     POC Urinalysis Dipstick    3. Essential hypertension          Return in about 4 months (around 2/23/2024) for Recheck Ovarian cyst with imaging .    Xander Rodgers MD   10/23/2023 10:22 EDT

## 2024-03-04 ENCOUNTER — HOSPITAL ENCOUNTER (OUTPATIENT)
Dept: CARDIOLOGY | Facility: HOSPITAL | Age: 49
Discharge: HOME | End: 2024-03-04
Payer: COMMERCIAL

## 2024-03-04 ENCOUNTER — LAB (OUTPATIENT)
Dept: LAB | Facility: HOSPITAL | Age: 49
End: 2024-03-04
Payer: MEDICAID

## 2024-03-04 ENCOUNTER — TRANSCRIBE ORDERS (OUTPATIENT)
Dept: ADMINISTRATIVE | Facility: HOSPITAL | Age: 49
End: 2024-03-04
Payer: COMMERCIAL

## 2024-03-04 DIAGNOSIS — N20.0 CALCULUS OF KIDNEY: ICD-10-CM

## 2024-03-04 DIAGNOSIS — N20.0 CALCULUS OF KIDNEY: Primary | ICD-10-CM

## 2024-03-04 LAB
ABO GROUP BLD: NORMAL
APTT PPP: 30.1 SECONDS (ref 24–31)
BLD GP AB SCN SERPL QL: NEGATIVE
INR PPP: 0.94 (ref 0.93–1.1)
PROTHROMBIN TIME: 10.3 SECONDS (ref 9.6–11.7)
RH BLD: POSITIVE
T&S EXPIRATION DATE: NORMAL

## 2024-03-04 PROCEDURE — 86900 BLOOD TYPING SEROLOGIC ABO: CPT

## 2024-03-04 PROCEDURE — 86901 BLOOD TYPING SEROLOGIC RH(D): CPT

## 2024-03-04 PROCEDURE — 85730 THROMBOPLASTIN TIME PARTIAL: CPT

## 2024-03-04 PROCEDURE — 80053 COMPREHEN METABOLIC PANEL: CPT

## 2024-03-04 PROCEDURE — 86850 RBC ANTIBODY SCREEN: CPT

## 2024-03-04 PROCEDURE — 85025 COMPLETE CBC W/AUTO DIFF WBC: CPT

## 2024-03-04 PROCEDURE — 36415 COLL VENOUS BLD VENIPUNCTURE: CPT

## 2024-03-04 PROCEDURE — 93005 ELECTROCARDIOGRAM TRACING: CPT | Performed by: ANESTHESIOLOGY

## 2024-03-04 PROCEDURE — 85610 PROTHROMBIN TIME: CPT

## 2024-03-05 LAB
ALBUMIN SERPL-MCNC: 4 G/DL (ref 3.5–5.2)
ALBUMIN/GLOB SERPL: 1.3 G/DL
ALP SERPL-CCNC: 170 U/L (ref 39–117)
ALT SERPL W P-5'-P-CCNC: 16 U/L (ref 1–33)
ANION GAP SERPL CALCULATED.3IONS-SCNC: 11.7 MMOL/L (ref 5–15)
AST SERPL-CCNC: 23 U/L (ref 1–32)
BASOPHILS # BLD AUTO: 0.03 10*3/MM3 (ref 0–0.2)
BASOPHILS NFR BLD AUTO: 0.4 % (ref 0–1.5)
BILIRUB SERPL-MCNC: <0.2 MG/DL (ref 0–1.2)
BUN SERPL-MCNC: 25 MG/DL (ref 6–20)
BUN/CREAT SERPL: 35.2 (ref 7–25)
CALCIUM SPEC-SCNC: 8.8 MG/DL (ref 8.6–10.5)
CHLORIDE SERPL-SCNC: 103 MMOL/L (ref 98–107)
CO2 SERPL-SCNC: 26.3 MMOL/L (ref 22–29)
CREAT SERPL-MCNC: 0.71 MG/DL (ref 0.57–1)
DEPRECATED RDW RBC AUTO: 46.3 FL (ref 37–54)
EGFRCR SERPLBLD CKD-EPI 2021: 106.3 ML/MIN/1.73
EOSINOPHIL # BLD AUTO: 0.13 10*3/MM3 (ref 0–0.4)
EOSINOPHIL NFR BLD AUTO: 1.5 % (ref 0.3–6.2)
ERYTHROCYTE [DISTWIDTH] IN BLOOD BY AUTOMATED COUNT: 14.8 % (ref 12.3–15.4)
GLOBULIN UR ELPH-MCNC: 3.2 GM/DL
GLUCOSE SERPL-MCNC: 87 MG/DL (ref 65–99)
HCT VFR BLD AUTO: 38.5 % (ref 34–46.6)
HGB BLD-MCNC: 12.3 G/DL (ref 12–15.9)
IMM GRANULOCYTES # BLD AUTO: 0.04 10*3/MM3 (ref 0–0.05)
IMM GRANULOCYTES NFR BLD AUTO: 0.5 % (ref 0–0.5)
LYMPHOCYTES # BLD AUTO: 3.56 10*3/MM3 (ref 0.7–3.1)
LYMPHOCYTES NFR BLD AUTO: 42.1 % (ref 19.6–45.3)
MCH RBC QN AUTO: 27.5 PG (ref 26.6–33)
MCHC RBC AUTO-ENTMCNC: 31.9 G/DL (ref 31.5–35.7)
MCV RBC AUTO: 85.9 FL (ref 79–97)
MONOCYTES # BLD AUTO: 0.72 10*3/MM3 (ref 0.1–0.9)
MONOCYTES NFR BLD AUTO: 8.5 % (ref 5–12)
NEUTROPHILS NFR BLD AUTO: 3.97 10*3/MM3 (ref 1.7–7)
NEUTROPHILS NFR BLD AUTO: 47 % (ref 42.7–76)
NRBC BLD AUTO-RTO: 0 /100 WBC (ref 0–0.2)
PLATELET # BLD AUTO: 204 10*3/MM3 (ref 140–450)
PMV BLD AUTO: 10.8 FL (ref 6–12)
POTASSIUM SERPL-SCNC: 4.7 MMOL/L (ref 3.5–5.2)
PROT SERPL-MCNC: 7.2 G/DL (ref 6–8.5)
RBC # BLD AUTO: 4.48 10*6/MM3 (ref 3.77–5.28)
SODIUM SERPL-SCNC: 141 MMOL/L (ref 136–145)
WBC NRBC COR # BLD AUTO: 8.45 10*3/MM3 (ref 3.4–10.8)

## 2024-03-06 ENCOUNTER — OFFICE VISIT (OUTPATIENT)
Dept: OBSTETRICS AND GYNECOLOGY | Age: 49
End: 2024-03-06
Payer: COMMERCIAL

## 2024-03-06 VITALS
HEIGHT: 64 IN | DIASTOLIC BLOOD PRESSURE: 70 MMHG | BODY MASS INDEX: 36.54 KG/M2 | WEIGHT: 214 LBS | SYSTOLIC BLOOD PRESSURE: 120 MMHG

## 2024-03-06 DIAGNOSIS — Z30.432 ENCOUNTER FOR IUD REMOVAL: ICD-10-CM

## 2024-03-06 DIAGNOSIS — N76.0 BV (BACTERIAL VAGINOSIS): ICD-10-CM

## 2024-03-06 DIAGNOSIS — B96.89 BV (BACTERIAL VAGINOSIS): ICD-10-CM

## 2024-03-06 DIAGNOSIS — Z97.5 IUD (INTRAUTERINE DEVICE) IN PLACE: ICD-10-CM

## 2024-03-06 DIAGNOSIS — N83.202 LEFT OVARIAN CYST: Primary | ICD-10-CM

## 2024-03-06 DIAGNOSIS — N89.8 VAGINAL ODOR: ICD-10-CM

## 2024-03-06 NOTE — PROGRESS NOTES
[facility]   Obstetrics and Gynecology     3/7/2024      Patient:  Maritza Asencio   MR#:[id number]    Office note    Chief Complaint   Patient presents with    Gynecologic Exam     CC: U/S results.  last pap 23 neg, HPV neg, last mammo 23 neg, wants IUD removed, will come back for annnual       Subjective     History of Present Illness  48 y.o. female  presents for follow-up on large left ovarian cyst reporting no symptoms.  Repeat imaging shows the cyst has decreased from 8 x 4 to 4 x 4.    Related to the IUD the patient reports excessive vaginal discharge that is foul-smelling.  The IUD appears correctly seated on ultrasound with the patient's requesting removal.      Relevant data reviewed:  US Non-ob Transvaginal (2024 10:35)      Objective   Patient Active Problem List   Diagnosis    Essential hypertension    Dyslipidemia    Renal duplicate collecting system, right    Anxiety    Hyperlipidemia    Gastric bypass status for obesity    IUD (intrauterine device) in place    Left ovarian cyst       Past Medical History:   Diagnosis Date    Anemia in pregnancy 2016    NO ISSUES CURRENTLY    Anxiety     Depression     Hyperlipidemia     Hypertension     Migraine     PONV (postoperative nausea and vomiting)     Pulmonary edema, acute 2016    Postpartum day#10, EKG nl, Echo normal EF, , Sx improved with lasix.  NO ISSUES SINCE    Right ovarian cyst 2023    UTI (urinary tract infection) during pregnancy 10/19/2015    E-coli. NO ISSUES CURRENTLY     Past Surgical History:   Procedure Laterality Date    ANAL FISSURECTOMY  2018     SECTION N/A 2016    Procedure:  SECTION PRIMARY;  Surgeon: Xander Rodgers MD;  Location: [facility];  Service:     CHOLECYSTECTOMY  1999    GASTRIC BYPASS  2021    HEMORRHOIDECTOMY  2018    VENTRAL HERNIA REPAIR N/A 2023    Procedure: Robotic ventral incisional hernia repair;   Surgeon: Jose Ramon Whalen MD;  [place] [facility] [place]  Service: Robotics - DaVinci;  Laterality: N/A;     Obstetric History:  OB History          2    Para   1    Term   1            AB   1    Living   1         SAB   1    IAB        Ectopic        Molar        Multiple   0    Live Births   1               Menstrual History:     No LMP recorded.       # 1 - Date: 2009, Sex: None, Weight: None, GA: 5w0d, Type: Spontaneous , Apgar1: None, Apgar5: None, Living: Fetal Demise, Birth Comments: None    # 2 - Date: 16, Sex: Female, Weight: 3595 g (7 lb 14.8 oz), GA: 39w1d, Type: , Low Transverse, Apgar1: 9, Apgar5: 9, Living: Living, Birth Comments: None    Family History   Problem Relation Age of Onset    Hypertension Mother     Heart disease Father     Diabetes Father     Heart attack Father          at age 52    Hyperlipidemia Father     Multiple sclerosis Sister     No Known Problems Daughter     Breast cancer Neg Hx     Ovarian cancer Neg Hx     Uterine cancer Neg Hx     Colon cancer Neg Hx     Malig Hyperthermia Neg Hx      Social History     Tobacco Use    Smoking status: Never     Passive exposure: Never    Smokeless tobacco: Never   Vaping Use    Vaping status: Never Used   Substance Use Topics    Alcohol use: Never    Drug use: Never     Patient has no known allergies.    Current Outpatient Medications:     atenolol (TENORMIN) 25 MG tablet, Take 1 tablet by mouth 2 (two) times a day., Disp: , Rfl:     baclofen (LIORESAL) 10 MG tablet, Take 1 tablet by mouth 2 (Two) Times a Day As Needed., Disp: , Rfl:     Calcium Carbonate-Vit D-Min (CALCIUM 1200 PO), Take  by mouth., Disp: , Rfl:     escitalopram (LEXAPRO) 10 MG tablet, TAKE ONE TABLET BY MOUTH ONE TIME EACH DAY, Disp: , Rfl:     Levonorgestrel (MIRENA) 20 MCG/DAY intrauterine device IUD, 1 each by Intrauterine route Every 8 (Eight) Years., Disp: , Rfl:     omeprazole (priLOSEC) 40 MG capsule, Take 1 capsule (40 mg  "total) by mouth 1 (one) time each day before breakfast. Do not crush or chew., Disp: , Rfl:     prenatal vitamin (prenatal, CLASSIC, vitamin) tablet, Take 1 tablet by mouth Daily., Disp: , Rfl:     metroNIDAZOLE (FLAGYL) 500 MG tablet, Take 1 tablet by mouth 2 (Two) Times a Day for 7 days., Disp: 14 tablet, Rfl: 0    The following portions of the patient's history were reviewed and updated as appropriate: allergies, current medications, past family history, past medical history, past social history, past surgical history, and problem list.    Review of Systems   Constitutional: Negative.    Respiratory: Negative.     Cardiovascular: Negative.    Gastrointestinal: Negative.    Genitourinary:  Negative for vaginal discharge.   Psychiatric/Behavioral: Negative.         BP Readings from Last 3 Encounters:   03/06/24 120/70   10/23/23 118/68   07/07/23 113/59      Wt Readings from Last 3 Encounters:   03/06/24 97.1 kg (214 lb)   10/23/23 88.9 kg (196 lb)   07/07/23 83.5 kg (184 lb 1.4 oz)      BMI: Estimated body mass index is 36.73 kg/m² as calculated from the following:    Height as of this encounter: 162.6 cm (64\").    Weight as of this encounter: 97.1 kg (214 lb). BSA: Estimated body surface area is 2.01 meters squared as calculated from the following:    Height as of this encounter: 162.6 cm (64\").    Weight as of this encounter: 97.1 kg (214 lb).    Physical Exam  Vitals and nursing note reviewed.   Constitutional:       General: She is not in acute distress.     Appearance: Normal appearance. She is well-developed. She is not ill-appearing.   HENT:      Head: Normocephalic.   Pulmonary:      Effort: Pulmonary effort is normal.   Abdominal:      General: Abdomen is flat. There is no distension.      Palpations: Abdomen is soft. There is no mass.      Tenderness: There is no abdominal tenderness.   Genitourinary:     Vagina: Normal.      Comments: IUD strings are visible    Prominent vaginal odor consistent with " bacterial vaginosis  Musculoskeletal:         General: No swelling or tenderness.      Comments: No calf tenderness or swelling    Neurological:      Mental Status: She is alert and oriented to person, place, and time.   Psychiatric:         Behavior: Behavior normal.         Thought Content: Thought content normal.         Judgment: Judgment normal.              Assessment & Plan   Diagnoses and all orders for this visit:    1. Left ovarian cyst (Primary)    2. IUD (intrauterine device) in place    3. Vaginal odor    4. BV (bacterial vaginosis)  -     metroNIDAZOLE (FLAGYL) 500 MG tablet; Take 1 tablet by mouth 2 (Two) Times a Day for 7 days.  Dispense: 14 tablet; Refill: 0    5. Encounter for IUD removal [Z30.432]          Return in about 4 months (around 7/6/2024) for Annual GYN exam with ultrasound .           Xander Rodgers MD   3/7/2024 11:53 EST

## 2024-03-06 NOTE — PROGRESS NOTES
Procedure     IUD Removal Procedure Note    Type of IUD:  Mirena  Date of insertion:  known  Reason for removal:   Vaginal discharge and odor  Other relevant history/information:  none    Procedure Time Out Documentation      Procedure Details  IUD strings visible:  yes  Local anesthesia:  None  Tenaculum used:  None  Removal:  IUD strings grasped and IUD removed intact with gentle traction.  The patient tolerated the procedure well.    All appropriate instructions regarding removal were reviewed.    Patient tolerated the procedure well without complications.    Plans for contraception:  no method    Other follow-up needed:  none    The patient was advised to call for any fever or for prolonged or severe pain or bleeding. She was advised to use NSAID as needed for mild to moderate pain.

## 2024-03-07 ENCOUNTER — TELEPHONE (OUTPATIENT)
Dept: OBSTETRICS AND GYNECOLOGY | Age: 49
End: 2024-03-07
Payer: COMMERCIAL

## 2024-03-07 LAB
QT INTERVAL: 438 MS
QTC INTERVAL: 415 MS

## 2024-03-07 RX ORDER — METRONIDAZOLE 500 MG/1
500 TABLET ORAL 2 TIMES DAILY
Qty: 14 TABLET | Refills: 0 | Status: SHIPPED | OUTPATIENT
Start: 2024-03-07 | End: 2024-03-14

## 2024-03-07 NOTE — TELEPHONE ENCOUNTER
Pt called states you were going to sent her an rx for BV yesterday, but it was never called in. Please advise. Thanks

## 2024-03-18 PROCEDURE — 82365 CALCULUS SPECTROSCOPY: CPT | Performed by: UROLOGY

## 2024-03-19 ENCOUNTER — LAB REQUISITION (OUTPATIENT)
Dept: LAB | Facility: HOSPITAL | Age: 49
End: 2024-03-19
Payer: COMMERCIAL

## 2024-03-19 DIAGNOSIS — N20.0 CALCULUS OF KIDNEY: ICD-10-CM

## 2024-03-26 LAB
CA CARBONATE CRY STONE QL IR: 50 %
COLOR STONE: NORMAL
COMPN STONE: NORMAL
LABORATORY COMMENT REPORT: NORMAL
LABORATORY COMMENT REPORT: NORMAL
Lab: NORMAL
Lab: NORMAL
PHOTO: NORMAL
SIZE STONE: NORMAL MM
SPEC SOURCE SUBJ: NORMAL
STONE ANALYSIS-IMP: NORMAL
TRI-PHOS MFR STONE: 50 %
WT STONE: 910 MG

## 2024-07-17 ENCOUNTER — OFFICE VISIT (OUTPATIENT)
Dept: OBSTETRICS AND GYNECOLOGY | Age: 49
End: 2024-07-17
Payer: COMMERCIAL

## 2024-07-17 VITALS
BODY MASS INDEX: 40.8 KG/M2 | HEIGHT: 64 IN | WEIGHT: 239 LBS | SYSTOLIC BLOOD PRESSURE: 130 MMHG | DIASTOLIC BLOOD PRESSURE: 80 MMHG

## 2024-07-17 DIAGNOSIS — Z11.51 SCREENING FOR HUMAN PAPILLOMAVIRUS (HPV): ICD-10-CM

## 2024-07-17 DIAGNOSIS — Z12.4 SCREENING FOR MALIGNANT NEOPLASM OF CERVIX: ICD-10-CM

## 2024-07-17 DIAGNOSIS — N83.202 LEFT OVARIAN CYST: ICD-10-CM

## 2024-07-17 DIAGNOSIS — Z13.89 SCREENING FOR BLOOD OR PROTEIN IN URINE: ICD-10-CM

## 2024-07-17 DIAGNOSIS — Z12.31 SCREENING MAMMOGRAM FOR BREAST CANCER: ICD-10-CM

## 2024-07-17 DIAGNOSIS — Z01.419 WELL FEMALE EXAM WITH ROUTINE GYNECOLOGICAL EXAM: Primary | ICD-10-CM

## 2024-07-17 DIAGNOSIS — N91.2 AMENORRHEA: ICD-10-CM

## 2024-07-17 PROBLEM — Z97.5 IUD (INTRAUTERINE DEVICE) IN PLACE: Status: RESOLVED | Noted: 2023-03-06 | Resolved: 2024-07-17

## 2024-07-17 NOTE — PROGRESS NOTES
[facility]   Obstetrics and Gynecology     2024    Patient: Maritza Asencio          MR#:[id number]    History of Present Illness    Chief Complaint   Patient presents with    Gynecologic Exam     CC: U/S results.  last pap 23 neg, HPV neg, last mammo 23 neg. No period since 3/2024, unable to void       49 y.o. female  who presents for annual exam.    The patient presents for her regular exam feeling well without complaints.  She reports no menstrual bleeding since her IUD was removed.  The patient denies menopausal symptoms.  Ultrasound shows a normal nonmenopausal endometrial thickness in the presence of a persistent asymptomatic simple left ovarian cyst.  We discussed options for management of the ovarian cyst and the patient wishes to continue conservative surveillance at this time    Relevant data reviewed:  US Non-ob Transvaginal (10/23/2023 09:29)     No LMP recorded (lmp unknown).  Obstetric History:  OB History          2    Para   1    Term   1            AB   1    Living   1         SAB   1    IAB        Ectopic        Molar        Multiple   0    Live Births   1               Menstrual History:     No LMP recorded (lmp unknown).       Social History     Substance and Sexual Activity   Sexual Activity Yes     ______________________________________  Patient Active Problem List   Diagnosis    Essential hypertension    Dyslipidemia    Renal duplicate collecting system, right    Anxiety    Hyperlipidemia    Gastric bypass status for obesity    Left ovarian cyst     Past Medical History:   Diagnosis Date    Anemia in pregnancy 2016    NO ISSUES CURRENTLY    Anxiety     Depression     Hyperlipidemia     Hypertension     Migraine     PONV (postoperative nausea and vomiting)     Pulmonary edema, acute 2016    Postpartum day#10, EKG nl, Echo normal EF, , Sx improved with lasix.  NO ISSUES SINCE    Right ovarian cyst 2023    UTI (urinary  tract infection) during pregnancy 10/19/2015    E-coli. NO ISSUES CURRENTLY     Past Surgical History:   Procedure Laterality Date    ANAL FISSURECTOMY  2018     SECTION N/A 2016    Procedure:  SECTION PRIMARY;  Surgeon: Xander Rodgers MD;  Location:  [facility];  Service:     CHOLECYSTECTOMY  1999    GASTRIC BYPASS  2021    HEMORRHOIDECTOMY  2018    KIDNEY STONE SURGERY  2024    VENTRAL HERNIA REPAIR N/A 2023    Procedure: Robotic ventral incisional hernia repair;  Surgeon: Jose Ramon Whalen MD;  [place]  [facility] [place]  Service: Robotics - DaVinci;  Laterality: N/A;     Social History     Tobacco Use   Smoking Status Never    Passive exposure: Never   Smokeless Tobacco Never     Family History   Problem Relation Age of Onset    Hypertension Mother     Heart disease Father     Diabetes Father     Heart attack Father          at age 52    Hyperlipidemia Father     Multiple sclerosis Sister     No Known Problems Daughter     Breast cancer Neg Hx     Ovarian cancer Neg Hx     Uterine cancer Neg Hx     Colon cancer Neg Hx     Malig Hyperthermia Neg Hx      Prior to Admission medications    Medication Sig Start Date End Date Taking? Authorizing Provider   atenolol (TENORMIN) 25 MG tablet Take 1 tablet by mouth 2 (two) times a day.   Yes Kerri Ruiz MD   baclofen (LIORESAL) 10 MG tablet Take 1 tablet by mouth 2 (Two) Times a Day As Needed. 23  Yes Kerri Ruiz MD   Calcium Carbonate-Vit D-Min (CALCIUM 1200 PO) Take  by mouth.   Yes Kerri Ruiz MD   escitalopram (LEXAPRO) 10 MG tablet TAKE ONE TABLET BY MOUTH ONE TIME EACH DAY 23  Yes Kerri Ruiz MD   omeprazole (priLOSEC) 40 MG capsule Take 1 capsule (40 mg total) by mouth 1 (one) time each day before breakfast. Do not crush or chew. 23  Yes Kerri Ruiz MD   prenatal vitamin (prenatal, CLASSIC, vitamin) tablet Take 1 tablet by mouth Daily.   Yes  "Provider, MD Kerri   Levonorgestrel (MIRENA) 20 MCG/DAY intrauterine device IUD 1 each by Intrauterine route Every 8 (Eight) Years.  Patient not taking: To be inserted one time by prescriber. Route intrauterine.  Reported on 7/17/2024 3/6/23 7/17/24  Xander Rodgers MD     _______________________________________    Current contraception: none  History of abnormal Pap smear: no  Family history of uterine or ovarian cancer: no  Family History of colon cancer/colon polyps: no  History of abnormal mammogram: no  History of abnormal lipids: no    The following portions of the patient's history were reviewed and updated as appropriate: allergies, current medications, past family history, past medical history, past social history, past surgical history, and problem list.    Review of Systems    Pertinent items are noted in HPI.       Objective   Physical Exam    /80   Ht 162.6 cm (64\")   Wt 108 kg (239 lb)   LMP  (LMP Unknown)   BMI 41.02 kg/m²    BP Readings from Last 3 Encounters:   07/17/24 130/80   03/06/24 120/70   10/23/23 118/68      Wt Readings from Last 3 Encounters:   07/17/24 108 kg (239 lb)   03/06/24 97.1 kg (214 lb)   10/23/23 88.9 kg (196 lb)        BMI: Estimated body mass index is 41.02 kg/m² as calculated from the following:    Height as of this encounter: 162.6 cm (64\").    Weight as of this encounter: 108 kg (239 lb).       General: alert, appears stated age, and cooperative   Heart: regular rate and rhythm, S1, S2 normal, no murmur, click, rub or gallop   Lungs: clear to auscultation bilaterally   Abdomen: soft, non-tender, without masses, no organomegaly   Breast: inspection negative, no nipple discharge or bleeding, no masses or nodularity palpable   External genitalia/Vulva: External genitalia including bartholin's glands, Urethra, [place]'s gland and urethra meatus are normal, Perineum, rectum and anus appear normal , and Bladder appears normal without significant prolapse    Vagina: " normal mucosa, normal discharge   Cervix: no lesions   Uterus: normal size and non-tender   Adnexa: exam limited by habitus     As part of wellness and prevention, the following topics were discussed with the patient:  Encouraged self breast exam  Physical activity and regular exercised encouraged.         Problem List   Meds  History  Prep for Surg   Imagin}    Assessment:  Diagnoses and all orders for this visit:    1. Well female exam with routine gynecological exam (Primary)  -     IGP, Apt HPV,rfx 16 / 18,45    2. Screening for human papillomavirus (HPV)  -     IGP, Apt HPV,rfx 16 / 18,45    3. Screening for malignant neoplasm of cervix  -     IGP, Apt HPV,rfx 16 / 18,45    4. Screening mammogram for breast cancer  -     Mammo Screening Digital Tomosynthesis Bilateral With CAD; Future    5. Screening for blood or protein in urine  -     Cancel: POC Urinalysis Dipstick    6. Amenorrhea  Comments:  since removal of the IUD.  Plan FSH and Provera withdrawal if not menopausal  Orders:  -     Follicle Stimulating Hormone    7. Left ovarian cyst  Comments:  The cyst is simple and stable without significant change.  The patient desires continued conservative management      Plan:  Return in 1 year (on 2025) for Annual exam.    Xander Rodgers MD  2024 12:45 EDT

## 2024-07-19 LAB
CYTOLOGIST CVX/VAG CYTO: NORMAL
CYTOLOGY CVX/VAG DOC CYTO: NORMAL
CYTOLOGY CVX/VAG DOC THIN PREP: NORMAL
DX ICD CODE: NORMAL
FSH SERPL-ACNC: 7.6 MIU/ML
HPV I/H RISK 4 DNA CVX QL PROBE+SIG AMP: NEGATIVE
Lab: NORMAL
OTHER STN SPEC: NORMAL
STAT OF ADQ CVX/VAG CYTO-IMP: NORMAL

## 2024-08-12 ENCOUNTER — HOSPITAL ENCOUNTER (OUTPATIENT)
Dept: MAMMOGRAPHY | Facility: HOSPITAL | Age: 49
Discharge: HOME | End: 2024-08-12
Admitting: OBSTETRICS & GYNECOLOGY
Payer: COMMERCIAL

## 2024-08-12 DIAGNOSIS — Z12.31 SCREENING MAMMOGRAM FOR BREAST CANCER: ICD-10-CM

## 2024-08-12 PROCEDURE — 77067 SCR MAMMO BI INCL CAD: CPT

## 2024-08-12 PROCEDURE — 77063 BREAST TOMOSYNTHESIS BI: CPT

## 2025-07-21 ENCOUNTER — TELEPHONE (OUTPATIENT)
Dept: OBSTETRICS AND GYNECOLOGY | Age: 50
End: 2025-07-21